# Patient Record
Sex: FEMALE | Race: ASIAN | NOT HISPANIC OR LATINO | Employment: UNEMPLOYED | URBAN - METROPOLITAN AREA
[De-identification: names, ages, dates, MRNs, and addresses within clinical notes are randomized per-mention and may not be internally consistent; named-entity substitution may affect disease eponyms.]

---

## 2022-06-24 ENCOUNTER — OFFICE VISIT (OUTPATIENT)
Dept: OBGYN CLINIC | Facility: CLINIC | Age: 57
End: 2022-06-24
Payer: COMMERCIAL

## 2022-06-24 ENCOUNTER — APPOINTMENT (OUTPATIENT)
Dept: RADIOLOGY | Facility: CLINIC | Age: 57
End: 2022-06-24
Payer: COMMERCIAL

## 2022-06-24 VITALS
WEIGHT: 190 LBS | DIASTOLIC BLOOD PRESSURE: 83 MMHG | HEIGHT: 65 IN | SYSTOLIC BLOOD PRESSURE: 138 MMHG | HEART RATE: 64 BPM | BODY MASS INDEX: 31.65 KG/M2

## 2022-06-24 DIAGNOSIS — M17.12 PRIMARY OSTEOARTHRITIS OF LEFT KNEE: ICD-10-CM

## 2022-06-24 DIAGNOSIS — M25.561 RIGHT KNEE PAIN, UNSPECIFIED CHRONICITY: ICD-10-CM

## 2022-06-24 DIAGNOSIS — M17.11 PRIMARY OSTEOARTHRITIS OF RIGHT KNEE: Primary | ICD-10-CM

## 2022-06-24 DIAGNOSIS — M25.562 LEFT KNEE PAIN, UNSPECIFIED CHRONICITY: ICD-10-CM

## 2022-06-24 PROCEDURE — 73562 X-RAY EXAM OF KNEE 3: CPT

## 2022-06-24 PROCEDURE — 99204 OFFICE O/P NEW MOD 45 MIN: CPT | Performed by: ORTHOPAEDIC SURGERY

## 2022-06-24 PROCEDURE — 20610 DRAIN/INJ JOINT/BURSA W/O US: CPT | Performed by: ORTHOPAEDIC SURGERY

## 2022-06-24 RX ORDER — DEXAMETHASONE SODIUM PHOSPHATE 100 MG/10ML
40 INJECTION INTRAMUSCULAR; INTRAVENOUS
Status: COMPLETED | OUTPATIENT
Start: 2022-06-24 | End: 2022-06-24

## 2022-06-24 RX ORDER — HYDROCHLOROTHIAZIDE 12.5 MG/1
12.5 TABLET ORAL DAILY
COMMUNITY
Start: 2022-05-07

## 2022-06-24 RX ORDER — FAMOTIDINE 20 MG/1
TABLET, FILM COATED ORAL
COMMUNITY
Start: 2022-06-23

## 2022-06-24 RX ORDER — AMLODIPINE BESYLATE 5 MG/1
5 TABLET ORAL DAILY
COMMUNITY
Start: 2022-05-07

## 2022-06-24 RX ORDER — FLUTICASONE PROPIONATE 50 MCG
SPRAY, SUSPENSION (ML) NASAL
COMMUNITY
Start: 2022-06-23

## 2022-06-24 RX ORDER — LIDOCAINE HYDROCHLORIDE 10 MG/ML
4 INJECTION, SOLUTION INFILTRATION; PERINEURAL
Status: COMPLETED | OUTPATIENT
Start: 2022-06-24 | End: 2022-06-24

## 2022-06-24 RX ORDER — ALBUTEROL SULFATE 90 UG/1
AEROSOL, METERED RESPIRATORY (INHALATION)
COMMUNITY
Start: 2022-06-23

## 2022-06-24 RX ORDER — LEVOTHYROXINE SODIUM 88 UG/1
TABLET ORAL
COMMUNITY
Start: 2022-06-23

## 2022-06-24 RX ORDER — LORATADINE 10 MG/1
10 TABLET ORAL DAILY
COMMUNITY
Start: 2022-06-23

## 2022-06-24 RX ORDER — ESTRADIOL 0.1 MG/G
CREAM VAGINAL
COMMUNITY
Start: 2022-04-28

## 2022-06-24 RX ADMIN — LIDOCAINE HYDROCHLORIDE 4 ML: 10 INJECTION, SOLUTION INFILTRATION; PERINEURAL at 16:40

## 2022-06-24 RX ADMIN — DEXAMETHASONE SODIUM PHOSPHATE 40 MG: 100 INJECTION INTRAMUSCULAR; INTRAVENOUS at 16:40

## 2022-06-24 NOTE — PROGRESS NOTES
Assessment/Plan:  1  Primary osteoarthritis of right knee  XR knee 3 vw right non injury    Large joint arthrocentesis: R knee   2  Primary osteoarthritis of left knee  XR knee 3 vw left non injury    Large joint arthrocentesis: L knee       Cecelia has bilateral knee pain consistent with osteoarthritis  Her right knee is little more severe than the left  She has moderate to severe osteoarthritis in her knees  We discussed multiple treatment options in the office today and had a lengthy discussion with the patient and her family as well as her physician son on the phone  We discussed conservative treatment including anti-inflammatories, ice, activity modification and weight loss  I also discussed more invasive treatment such as cortisone injection or viscosupplementation  I also discussed will be involved with a knee replacement if needed in the future  Patient ultimately wanted to proceed with bilateral cortisone injections which we proceed with today and she tolerated very well  Hopefully these give her significant relief going forward  I will see her back in 4 months for repeat evaluation or sooner if needed if pain increases  Large joint arthrocentesis: R knee  Universal Protocol:  Consent: Verbal consent obtained  Risks and benefits: risks, benefits and alternatives were discussed  Consent given by: patient  Time out: Immediately prior to procedure a "time out" was called to verify the correct patient, procedure, equipment, support staff and site/side marked as required    Site marked: the operative site was marked  Supporting Documentation  Indications: pain   Procedure Details  Location: knee - R knee  Preparation: Patient was prepped and draped in the usual sterile fashion  Needle size: 22 G  Ultrasound guidance: no  Approach: anterolateral  Medications administered: 40 mg dexamethasone 100 mg/10 mL; 4 mL lidocaine 1 %    Patient tolerance: patient tolerated the procedure well with no immediate complications  Dressing:  Sterile dressing applied    Large joint arthrocentesis: L knee  Universal Protocol:  Consent: Verbal consent obtained  Risks and benefits: risks, benefits and alternatives were discussed  Consent given by: patient  Time out: Immediately prior to procedure a "time out" was called to verify the correct patient, procedure, equipment, support staff and site/side marked as required  Site marked: the operative site was marked  Supporting Documentation  Indications: pain   Procedure Details  Location: knee - L knee  Preparation: Patient was prepped and draped in the usual sterile fashion  Needle size: 22 G  Ultrasound guidance: no  Approach: anterolateral  Medications administered: 40 mg dexamethasone 100 mg/10 mL; 4 mL lidocaine 1 %    Patient tolerance: patient tolerated the procedure well with no immediate complications  Dressing:  Sterile dressing applied          Subjective:   Lady Murrell is a 60 y o  female who presents to the office for evaluation for bilateral knee pain  She has a history of ongoing discomfort in both knees but the right appears to be more painful than the left  She has a history of osteoarthritis and went to another orthopedic office many years ago and had a cortisone injection the right knee  She does report that the injection helped slightly but she has not return for any repeated injections  As of late she denies any new injury but states he has been feeling aching throbbing pain to the medial aspect of both knees  The right is worse than the left  It worsens to walk or move and she has discomfort when she trying to sleep  She denies any swelling or effusion in her knees  She has been trying to take Tylenol and use topical Voltaren which has not significantly helped  She will occasionally take an Advil although she tries to avoid this with her history of GERD  Review of Systems   Constitutional: Negative for chills, fever and unexpected weight change  HENT: Negative for hearing loss, nosebleeds and sore throat  Eyes: Negative for pain, redness and visual disturbance  Respiratory: Negative for cough, shortness of breath and wheezing  Cardiovascular: Negative for chest pain, palpitations and leg swelling  Gastrointestinal: Negative for abdominal pain, nausea and vomiting  Endocrine: Negative for polydipsia and polyuria  Genitourinary: Negative for dysuria and hematuria  Musculoskeletal:        See HPI   Skin: Negative for rash and wound  Neurological: Negative for dizziness, numbness and headaches  Psychiatric/Behavioral: Negative for decreased concentration and suicidal ideas  The patient is not nervous/anxious  No past medical history on file  No past surgical history on file  No family history on file      Social History     Occupational History    Not on file   Tobacco Use    Smoking status: Not on file    Smokeless tobacco: Not on file   Substance and Sexual Activity    Alcohol use: Not on file    Drug use: Not on file    Sexual activity: Not on file         Current Outpatient Medications:     albuterol (PROVENTIL HFA,VENTOLIN HFA) 90 mcg/act inhaler, INHALE 2 PUFFS BY MOUTH FOUR TIMES A DAY FOR 30 DAYS, Disp: , Rfl:     amLODIPine (NORVASC) 5 mg tablet, Take 5 mg by mouth daily, Disp: , Rfl:     Calcium Carbonate-Vitamin D3 600-400 MG-UNIT TABS, Take 1 tablet by mouth 2 (two) times a day, Disp: , Rfl:     Diclofenac Sodium (VOLTAREN) 1 %, APPLY 2 GRAMS TO THE AFFECTED AREAS BY TOPICAL ROUTE 4 TIMES PER DAY, Disp: , Rfl:     estradiol (ESTRACE) 0 1 mg/g vaginal cream, APPLY 2G INTRAVAGINALLY THREE TIMES A WEEK AT BEDTIME FOR 2 WEEKS, THEN TWICE A WEEK AT BEDTIME FOR 2 WEEKS, AND THEN ONE TIME A WEEK THEREA, Disp: , Rfl:     famotidine (PEPCID) 20 mg tablet, TAKE 1 TABLET BY MOUTH EVERY EVENING IF NEEDED, Disp: , Rfl:     fluticasone (FLONASE) 50 mcg/act nasal spray, SPRAY 2 SPRAYS INTO EACH NOSTRIL AT BEDTIME, Disp: , Rfl:     hydrochlorothiazide (HYDRODIURIL) 12 5 mg tablet, Take 12 5 mg by mouth daily, Disp: , Rfl:     levothyroxine 88 mcg tablet, TAKE 1 TABLET EVERY DAY BY ORAL ROUTE , Disp: , Rfl:     loratadine (CLARITIN) 10 mg tablet, Take 10 mg by mouth daily, Disp: , Rfl:     No Known Allergies    Objective:  Vitals:    06/24/22 1456   BP: 138/83   Pulse: 64       Right Knee Exam     Tenderness   The patient is experiencing tenderness in the medial joint line  Range of Motion   Extension: normal   Flexion: normal     Other   Erythema: absent  Sensation: normal  Pulse: present  Swelling: none  Effusion: no effusion present      Left Knee Exam     Tenderness   The patient is experiencing tenderness in the medial joint line  Range of Motion   Extension: normal   Flexion: normal     Other   Erythema: absent  Sensation: normal  Pulse: present  Swelling: none  Effusion: no effusion present          Observations   Left Knee   Negative for effusion  Right Knee   Negative for effusion  Physical Exam  Vitals and nursing note reviewed  Constitutional:       Appearance: She is well-developed  HENT:      Head: Normocephalic and atraumatic  Eyes:      General: No scleral icterus  Conjunctiva/sclera: Conjunctivae normal    Cardiovascular:      Rate and Rhythm: Normal rate  Pulmonary:      Effort: Pulmonary effort is normal  No respiratory distress  Musculoskeletal:      Cervical back: Normal range of motion and neck supple  Right knee: No effusion  Left knee: No effusion  Comments: As noted in HPI   Skin:     General: Skin is warm and dry  Neurological:      Mental Status: She is alert and oriented to person, place, and time     Psychiatric:         Behavior: Behavior normal            I have personally reviewed pertinent films in PACS and my interpretation is as follows:  X-rays of bilateral knees demonstrate moderate to severe medial compartment narrowing in the right knee and moderate medial compartment narrowing in the left knee    Posterior left knee calcifications appear vascular in nature

## 2022-07-14 ENCOUNTER — TELEPHONE (OUTPATIENT)
Dept: OBGYN CLINIC | Facility: HOSPITAL | Age: 57
End: 2022-07-14

## 2022-07-14 NOTE — TELEPHONE ENCOUNTER
Patient calling back with fax #  She is seeing her doctor on Saturday      Dr Js Mueller  Fax # [phone]

## 2022-07-14 NOTE — TELEPHONE ENCOUNTER
Patient calling in asking if she can get a call back from Dr Omayra Negro  She feels like she may need to be sooner for her knees  She is also asking about the results of her x-rays done on 6/24  Patient would like her x-ray reports sent to her family doctor   She is going to call back with a fax #     cb # [phone]

## 2022-07-14 NOTE — TELEPHONE ENCOUNTER
Her x-ray reports stated she has arthritis over the medial compartment of both knees  This is what I explained to her in the office  If the cortisone injections did not significantly help we could proceed with lubricant injections in her knees and order them

## 2022-07-15 DIAGNOSIS — M17.11 PRIMARY OSTEOARTHRITIS OF RIGHT KNEE: Primary | ICD-10-CM

## 2022-07-15 DIAGNOSIS — M17.12 PRIMARY OSTEOARTHRITIS OF LEFT KNEE: ICD-10-CM

## 2022-07-15 NOTE — TELEPHONE ENCOUNTER
Order has been placed, Please schedule patient once approved    Appointment should be under 30 min slot as it will be done under ultrasound

## 2022-07-20 NOTE — TELEPHONE ENCOUNTER
Patient called into the office, she is a little confused and would like to speak with clinical in regards to this       I did try and explain to her but she needs a little further assistance     [phone]

## 2022-08-26 ENCOUNTER — TELEPHONE (OUTPATIENT)
Dept: OBGYN CLINIC | Facility: HOSPITAL | Age: 57
End: 2022-08-26

## 2022-08-26 NOTE — TELEPHONE ENCOUNTER
Patient wants to know when she will get relief from the pain after gel injection  Patient advised there will be some local irritation to the knee for a few days after the injections and then 4-6 weeks after last injection for full effect  Patient is wondering if the injections do not work can her R knee be helped with a knee scope vs a knee replacement

## 2022-08-26 NOTE — TELEPHONE ENCOUNTER
If his lubricant injections do not help her knees and I will direct her for consultation for knee replacement if needed    Let's see if these injections help her first

## 2022-08-26 NOTE — TELEPHONE ENCOUNTER
Patient has some questions in regards to the gel injections she will be receiving  Patient can be reached at [phone]

## 2022-09-10 ENCOUNTER — PROCEDURE VISIT (OUTPATIENT)
Dept: OBGYN CLINIC | Facility: CLINIC | Age: 57
End: 2022-09-10
Payer: COMMERCIAL

## 2022-09-10 DIAGNOSIS — M17.12 PRIMARY OSTEOARTHRITIS OF LEFT KNEE: Primary | ICD-10-CM

## 2022-09-10 DIAGNOSIS — M17.11 PRIMARY OSTEOARTHRITIS OF RIGHT KNEE: ICD-10-CM

## 2022-09-10 PROCEDURE — 20610 DRAIN/INJ JOINT/BURSA W/O US: CPT | Performed by: ORTHOPAEDIC SURGERY

## 2022-09-10 RX ORDER — HYALURONATE SODIUM 10 MG/ML
20 SYRINGE (ML) INTRAARTICULAR
Status: COMPLETED | OUTPATIENT
Start: 2022-09-10 | End: 2022-09-10

## 2022-09-10 RX ADMIN — Medication 20 MG: at 09:39

## 2022-09-10 NOTE — PROGRESS NOTES
Assessment/Plan:  1  Primary osteoarthritis of left knee  Large joint arthrocentesis: L knee   2  Primary osteoarthritis of right knee  Large joint arthrocentesis: R knee       Cecelia tolerated bilateral Euflexxa injections today  This was the first of the series of injections  Follow-up in 1 week for the next injection  Subjective:   Zaire Arevalo is a 60 y o  female who presents to the office for her first Euflexxa injections bilaterally         No past medical history on file  No past surgical history on file  No family history on file      Social History     Occupational History    Not on file   Tobacco Use    Smoking status: Not on file    Smokeless tobacco: Not on file   Substance and Sexual Activity    Alcohol use: Not on file    Drug use: Not on file    Sexual activity: Not on file         Current Outpatient Medications:     albuterol (PROVENTIL HFA,VENTOLIN HFA) 90 mcg/act inhaler, INHALE 2 PUFFS BY MOUTH FOUR TIMES A DAY FOR 30 DAYS, Disp: , Rfl:     amLODIPine (NORVASC) 5 mg tablet, Take 5 mg by mouth daily, Disp: , Rfl:     Calcium Carbonate-Vitamin D3 600-400 MG-UNIT TABS, Take 1 tablet by mouth 2 (two) times a day, Disp: , Rfl:     Diclofenac Sodium (VOLTAREN) 1 %, APPLY 2 GRAMS TO THE AFFECTED AREAS BY TOPICAL ROUTE 4 TIMES PER DAY, Disp: , Rfl:     estradiol (ESTRACE) 0 1 mg/g vaginal cream, APPLY 2G INTRAVAGINALLY THREE TIMES A WEEK AT BEDTIME FOR 2 WEEKS, THEN TWICE A WEEK AT BEDTIME FOR 2 WEEKS, AND THEN ONE TIME A WEEK THEREA, Disp: , Rfl:     famotidine (PEPCID) 20 mg tablet, TAKE 1 TABLET BY MOUTH EVERY EVENING IF NEEDED, Disp: , Rfl:     fluticasone (FLONASE) 50 mcg/act nasal spray, SPRAY 2 SPRAYS INTO EACH NOSTRIL AT BEDTIME, Disp: , Rfl:     hydrochlorothiazide (HYDRODIURIL) 12 5 mg tablet, Take 12 5 mg by mouth daily, Disp: , Rfl:     levothyroxine 88 mcg tablet, TAKE 1 TABLET EVERY DAY BY ORAL ROUTE , Disp: , Rfl:     loratadine (CLARITIN) 10 mg tablet, Take 10 mg by mouth daily, Disp: , Rfl:     No Known Allergies    Objective: There were no vitals filed for this visit  Ortho Exam    Physical Exam    Large joint arthrocentesis: R knee  Universal Protocol:  Consent given by: patient  Site marked: the operative site was marked  Supporting Documentation  Indications: pain   Procedure Details  Location: knee - R knee  Needle size: 22 G  Ultrasound guidance: no  Approach: anterolateral  Medications administered: 20 mg Sodium Hyaluronate 20 MG/2ML  Specialty Pharmacy Supplied: received medications from pharmacy  Patient tolerance: patient tolerated the procedure well with no immediate complications  Dressing:  Sterile dressing applied    Large joint arthrocentesis: L knee  Universal Protocol:  Consent: Verbal consent obtained  Risks and benefits: risks, benefits and alternatives were discussed  Consent given by: patient  Site marked: the operative site was marked  Supporting Documentation  Indications: pain   Procedure Details  Location: knee - L knee  Needle size: 22 G  Ultrasound guidance: no  Approach: anterolateral  Medications administered: 20 mg Sodium Hyaluronate 20 MG/2ML  Specialty Pharmacy Supplied: received medications from pharmacy  Patient tolerance: patient tolerated the procedure well with no immediate complications  Dressing:  Sterile dressing applied            This document was created using speech voice recognition software  Grammatical errors, random word insertions, pronoun errors, and incomplete sentences are an occasional consequence of this system due to software limitations, ambient noise, and hardware issues  Any formal questions or concerns about content, text, or information contained within the body of this dictation should be directly addressed to the provider for clarification

## 2022-09-20 ENCOUNTER — PROCEDURE VISIT (OUTPATIENT)
Dept: OBGYN CLINIC | Facility: CLINIC | Age: 57
End: 2022-09-20
Payer: COMMERCIAL

## 2022-09-20 VITALS — WEIGHT: 190 LBS | BODY MASS INDEX: 31.62 KG/M2

## 2022-09-20 DIAGNOSIS — M17.12 PRIMARY OSTEOARTHRITIS OF LEFT KNEE: Primary | ICD-10-CM

## 2022-09-20 DIAGNOSIS — M17.11 PRIMARY OSTEOARTHRITIS OF RIGHT KNEE: ICD-10-CM

## 2022-09-20 PROCEDURE — 20610 DRAIN/INJ JOINT/BURSA W/O US: CPT | Performed by: ORTHOPAEDIC SURGERY

## 2022-09-20 RX ORDER — HYALURONATE SODIUM 10 MG/ML
20 SYRINGE (ML) INTRAARTICULAR
Status: COMPLETED | OUTPATIENT
Start: 2022-09-20 | End: 2022-09-20

## 2022-09-20 RX ADMIN — Medication 20 MG: at 18:18

## 2022-09-20 NOTE — PROGRESS NOTES
Assessment/Plan:  1  Primary osteoarthritis of left knee     2  Primary osteoarthritis of right knee       Cecelia tolerated bilateral Euflexxa injections today  Follow-up in 1 week for the last injections  Subjective:   Stanton Sky is a 60 y o  female who presents for her second Euflexxa injection bilaterally         No past medical history on file  No past surgical history on file  No family history on file  Social History     Occupational History    Not on file   Tobacco Use    Smoking status: Not on file    Smokeless tobacco: Not on file   Substance and Sexual Activity    Alcohol use: Not on file    Drug use: Not on file    Sexual activity: Not on file         Current Outpatient Medications:     albuterol (PROVENTIL HFA,VENTOLIN HFA) 90 mcg/act inhaler, INHALE 2 PUFFS BY MOUTH FOUR TIMES A DAY FOR 30 DAYS, Disp: , Rfl:     amLODIPine (NORVASC) 5 mg tablet, Take 5 mg by mouth daily, Disp: , Rfl:     Calcium Carbonate-Vitamin D3 600-400 MG-UNIT TABS, Take 1 tablet by mouth 2 (two) times a day, Disp: , Rfl:     Diclofenac Sodium (VOLTAREN) 1 %, APPLY 2 GRAMS TO THE AFFECTED AREAS BY TOPICAL ROUTE 4 TIMES PER DAY, Disp: , Rfl:     estradiol (ESTRACE) 0 1 mg/g vaginal cream, APPLY 2G INTRAVAGINALLY THREE TIMES A WEEK AT BEDTIME FOR 2 WEEKS, THEN TWICE A WEEK AT BEDTIME FOR 2 WEEKS, AND THEN ONE TIME A WEEK THEREA, Disp: , Rfl:     famotidine (PEPCID) 20 mg tablet, TAKE 1 TABLET BY MOUTH EVERY EVENING IF NEEDED, Disp: , Rfl:     fluticasone (FLONASE) 50 mcg/act nasal spray, SPRAY 2 SPRAYS INTO EACH NOSTRIL AT BEDTIME, Disp: , Rfl:     hydrochlorothiazide (HYDRODIURIL) 12 5 mg tablet, Take 12 5 mg by mouth daily, Disp: , Rfl:     levothyroxine 88 mcg tablet, TAKE 1 TABLET EVERY DAY BY ORAL ROUTE , Disp: , Rfl:     loratadine (CLARITIN) 10 mg tablet, Take 10 mg by mouth daily, Disp: , Rfl:     No Known Allergies    Objective: There were no vitals filed for this visit      Ortho Exam    Physical Exam    Large joint arthrocentesis: R knee  Universal Protocol:  Consent: Verbal consent obtained  Risks and benefits: risks, benefits and alternatives were discussed  Consent given by: patient  Site marked: the operative site was marked  Supporting Documentation  Indications: pain   Procedure Details  Location: knee - R knee  Needle size: 22 G  Ultrasound guidance: no  Approach: anterolateral  Medications administered: 20 mg Sodium Hyaluronate 20 MG/2ML  Specialty Pharmacy Supplied: received medications from pharmacy  Patient tolerance: patient tolerated the procedure well with no immediate complications  Dressing:  Sterile dressing applied    Large joint arthrocentesis: L knee  Universal Protocol:  Consent: Verbal consent obtained  Risks and benefits: risks, benefits and alternatives were discussed  Consent given by: patient  Site marked: the operative site was marked  Supporting Documentation  Indications: pain   Procedure Details  Location: knee - L knee  Needle size: 22 G  Ultrasound guidance: no  Approach: anterolateral  Medications administered: 20 mg Sodium Hyaluronate 20 MG/2ML  Specialty Pharmacy Supplied: received medications from pharmacy  Patient tolerance: patient tolerated the procedure well with no immediate complications  Dressing:  Sterile dressing applied            This document was created using speech voice recognition software  Grammatical errors, random word insertions, pronoun errors, and incomplete sentences are an occasional consequence of this system due to software limitations, ambient noise, and hardware issues  Any formal questions or concerns about content, text, or information contained within the body of this dictation should be directly addressed to the provider for clarification

## 2022-09-27 ENCOUNTER — PROCEDURE VISIT (OUTPATIENT)
Dept: OBGYN CLINIC | Facility: CLINIC | Age: 57
End: 2022-09-27
Payer: COMMERCIAL

## 2022-09-27 DIAGNOSIS — M17.12 PRIMARY OSTEOARTHRITIS OF LEFT KNEE: Primary | ICD-10-CM

## 2022-09-27 DIAGNOSIS — M17.11 PRIMARY OSTEOARTHRITIS OF RIGHT KNEE: ICD-10-CM

## 2022-09-27 PROCEDURE — 20610 DRAIN/INJ JOINT/BURSA W/O US: CPT | Performed by: ORTHOPAEDIC SURGERY

## 2022-09-27 RX ORDER — HYALURONATE SODIUM 10 MG/ML
20 SYRINGE (ML) INTRAARTICULAR
Status: COMPLETED | OUTPATIENT
Start: 2022-09-27 | End: 2022-09-27

## 2022-09-27 RX ADMIN — Medication 20 MG: at 18:32

## 2022-09-27 NOTE — PROGRESS NOTES
Assessment/Plan:  1  Primary osteoarthritis of left knee     2  Primary osteoarthritis of right knee       Cecelia tolerated bilateral Euflexxa injections today  We could repeat these injections in 6 months if clinically indicated  Subjective:   Gina Vidal is a 60 y o  female who presents to the office for her third Euflexxa injection bilaterally  No past medical history on file  No past surgical history on file  No family history on file  Social History     Occupational History    Not on file   Tobacco Use    Smoking status: Not on file    Smokeless tobacco: Not on file   Substance and Sexual Activity    Alcohol use: Not on file    Drug use: Not on file    Sexual activity: Not on file         Current Outpatient Medications:     albuterol (PROVENTIL HFA,VENTOLIN HFA) 90 mcg/act inhaler, INHALE 2 PUFFS BY MOUTH FOUR TIMES A DAY FOR 30 DAYS, Disp: , Rfl:     amLODIPine (NORVASC) 5 mg tablet, Take 5 mg by mouth daily, Disp: , Rfl:     Calcium Carbonate-Vitamin D3 600-400 MG-UNIT TABS, Take 1 tablet by mouth 2 (two) times a day, Disp: , Rfl:     Diclofenac Sodium (VOLTAREN) 1 %, APPLY 2 GRAMS TO THE AFFECTED AREAS BY TOPICAL ROUTE 4 TIMES PER DAY, Disp: , Rfl:     estradiol (ESTRACE) 0 1 mg/g vaginal cream, APPLY 2G INTRAVAGINALLY THREE TIMES A WEEK AT BEDTIME FOR 2 WEEKS, THEN TWICE A WEEK AT BEDTIME FOR 2 WEEKS, AND THEN ONE TIME A WEEK THEREA, Disp: , Rfl:     famotidine (PEPCID) 20 mg tablet, TAKE 1 TABLET BY MOUTH EVERY EVENING IF NEEDED, Disp: , Rfl:     fluticasone (FLONASE) 50 mcg/act nasal spray, SPRAY 2 SPRAYS INTO EACH NOSTRIL AT BEDTIME, Disp: , Rfl:     hydrochlorothiazide (HYDRODIURIL) 12 5 mg tablet, Take 12 5 mg by mouth daily, Disp: , Rfl:     levothyroxine 88 mcg tablet, TAKE 1 TABLET EVERY DAY BY ORAL ROUTE , Disp: , Rfl:     loratadine (CLARITIN) 10 mg tablet, Take 10 mg by mouth daily, Disp: , Rfl:     No Known Allergies    Objective:   There were no vitals filed for this visit  Ortho Exam    Physical Exam    Large joint arthrocentesis: R knee  Universal Protocol:  Consent: Verbal consent obtained  Risks and benefits: risks, benefits and alternatives were discussed  Consent given by: patient  Site marked: the operative site was marked  Supporting Documentation  Indications: pain   Procedure Details  Location: knee - R knee  Needle size: 22 G  Ultrasound guidance: no  Approach: anterolateral  Medications administered: 20 mg Sodium Hyaluronate 20 MG/2ML  Specialty Pharmacy Supplied: received medications from pharmacy  Patient tolerance: patient tolerated the procedure well with no immediate complications  Dressing:  Sterile dressing applied    Large joint arthrocentesis: L knee  Universal Protocol:  Consent: Verbal consent obtained  Risks and benefits: risks, benefits and alternatives were discussed  Consent given by: patient  Site marked: the operative site was marked  Supporting Documentation  Indications: pain   Procedure Details  Location: knee - L knee  Needle size: 22 G  Ultrasound guidance: no  Approach: anterolateral  Medications administered: 20 mg Sodium Hyaluronate 20 MG/2ML  Specialty Pharmacy Supplied: received medications from pharmacy  Patient tolerance: patient tolerated the procedure well with no immediate complications  Dressing:  Sterile dressing applied            This document was created using speech voice recognition software  Grammatical errors, random word insertions, pronoun errors, and incomplete sentences are an occasional consequence of this system due to software limitations, ambient noise, and hardware issues  Any formal questions or concerns about content, text, or information contained within the body of this dictation should be directly addressed to the provider for clarification

## 2023-02-10 LAB
EXTERNAL HIV SCREEN: NORMAL
HBA1C MFR BLD HPLC: 6 %

## 2023-02-17 ENCOUNTER — OFFICE VISIT (OUTPATIENT)
Dept: OBGYN CLINIC | Facility: CLINIC | Age: 58
End: 2023-02-17

## 2023-02-17 VITALS
HEART RATE: 75 BPM | BODY MASS INDEX: 31.65 KG/M2 | DIASTOLIC BLOOD PRESSURE: 80 MMHG | HEIGHT: 65 IN | WEIGHT: 190 LBS | SYSTOLIC BLOOD PRESSURE: 145 MMHG

## 2023-02-17 DIAGNOSIS — M17.12 PRIMARY OSTEOARTHRITIS OF LEFT KNEE: ICD-10-CM

## 2023-02-17 DIAGNOSIS — M17.11 PRIMARY OSTEOARTHRITIS OF RIGHT KNEE: Primary | ICD-10-CM

## 2023-02-17 RX ORDER — BLOOD-GLUCOSE METER
EACH MISCELLANEOUS
COMMUNITY
Start: 2023-01-09

## 2023-02-17 RX ORDER — OMEPRAZOLE 40 MG/1
40 CAPSULE, DELAYED RELEASE ORAL EVERY MORNING
COMMUNITY
Start: 2023-02-07

## 2023-02-17 RX ORDER — CHOLECALCIFEROL (VITAMIN D3) 25 MCG
1000 CAPSULE ORAL DAILY
COMMUNITY
Start: 2022-12-05

## 2023-02-17 RX ORDER — DEXTROMETHORPHAN HYDROBROMIDE AND PROMETHAZINE HYDROCHLORIDE 15; 6.25 MG/5ML; MG/5ML
SYRUP ORAL
COMMUNITY
Start: 2023-01-01

## 2023-02-17 RX ORDER — LEVOFLOXACIN 500 MG/1
TABLET, FILM COATED ORAL
COMMUNITY
Start: 2023-01-01

## 2023-02-17 RX ORDER — ATORVASTATIN CALCIUM 20 MG/1
TABLET, FILM COATED ORAL
COMMUNITY
Start: 2023-02-07

## 2023-02-17 RX ORDER — DEXTROMETHORPHAN HBR, GUAIFENESIN 20; 400 MG/20ML; MG/20ML
SOLUTION ORAL
COMMUNITY
Start: 2023-01-11

## 2023-02-17 RX ORDER — BLOOD SUGAR DIAGNOSTIC
STRIP MISCELLANEOUS
COMMUNITY
Start: 2023-01-19

## 2023-02-17 RX ORDER — PREDNISONE 20 MG/1
TABLET ORAL
COMMUNITY
Start: 2023-01-01

## 2023-02-17 NOTE — PROGRESS NOTES
Assessment/Plan:  1  Primary osteoarthritis of right knee  Ambulatory referral to Orthopedic Surgery    Ambulatory referral to Physical Therapy      2  Primary osteoarthritis of left knee  Ambulatory referral to Orthopedic Surgery    Ambulatory referral to Physical Therapy        Cecelia has continued bilateral knee pain and has not significantly improved with conservative measures of viscosupplementation  While they do help slightly she has a lot of questions about the potential for knee replacement  She does not want to continue with viscosupplementation just yet and would like to discuss knee replacement possibility as she is tired of feeling this pain  I did refer her to Dr Lena Cagle to discuss potential knee replacement likely beginning with the right knee first   She will most likely require new x-rays at her next visit with Dr Lena Cagle  If she decides to pursue viscosupplementation either in the left knee or both knees in 1 month we could see her in the [facility] office to administer those  She will also begin physical therapy to see if this does help for her at this time  Follow-up with Dr Lena Cagle at the next available appointment  Subjective:   Volodymyr Mejia is a 59 y o  female who presents to the office for evaluation for bilateral knee pain  She has a history of osteoarthritis in both knees and underwent a series of Visco supplement injections 5 months ago in our office  She states she experienced relief from these injections for about 3 months and now the pain has increased over the last 2 months  She has pain that is worse in the right knee than the left  It significantly bothers her after a lot of walking  She had already failed cortisone injections initially  X-rays 1 year ago showed moderate osteoarthritis in the right knee and mild in the left knee  Review of Systems   Constitutional: Negative for chills, fever and unexpected weight change     HENT: Negative for hearing loss, nosebleeds and sore throat  Eyes: Negative for pain, redness and visual disturbance  Respiratory: Negative for cough, shortness of breath and wheezing  Cardiovascular: Negative for chest pain, palpitations and leg swelling  Gastrointestinal: Negative for abdominal pain, nausea and vomiting  Endocrine: Negative for polydipsia and polyuria  Genitourinary: Negative for dysuria and hematuria  Musculoskeletal:        See HPI   Skin: Negative for rash and wound  Neurological: Negative for dizziness, numbness and headaches  Psychiatric/Behavioral: Negative for decreased concentration and suicidal ideas  The patient is not nervous/anxious  History reviewed  No pertinent past medical history  History reviewed  No pertinent surgical history  History reviewed  No pertinent family history      Social History     Occupational History   • Not on file   Tobacco Use   • Smoking status: Never   • Smokeless tobacco: Never   Vaping Use   • Vaping Use: Never used   Substance and Sexual Activity   • Alcohol use: Not on file   • Drug use: Not on file   • Sexual activity: Not on file         Current Outpatient Medications:   •  albuterol (PROVENTIL HFA,VENTOLIN HFA) 90 mcg/act inhaler, INHALE 2 PUFFS BY MOUTH FOUR TIMES A DAY FOR 30 DAYS, Disp: , Rfl:   •  amLODIPine (NORVASC) 5 mg tablet, Take 5 mg by mouth daily, Disp: , Rfl:   •  atorvastatin (LIPITOR) 20 mg tablet, TAKE 1 TABLET EVERY DAY BY ORAL ROUTE , Disp: , Rfl:   •  Blood Glucose Monitoring Suppl (ONE TOUCH ULTRA 2) w/Device KIT, USE AS DIRECTED FOR 30 DAYS, Disp: , Rfl:   •  Calcium Carbonate-Vitamin D3 600-400 MG-UNIT TABS, Take 1 tablet by mouth 2 (two) times a day, Disp: , Rfl:   •  D3 High Potency 25 MCG (1000 UT) capsule, Take 1,000 Units by mouth daily, Disp: , Rfl:   •  Diclofenac Sodium (VOLTAREN) 1 %, APPLY 2 GRAMS TO THE AFFECTED AREAS BY TOPICAL ROUTE 4 TIMES PER DAY, Disp: , Rfl:   •  estradiol (ESTRACE) 0 1 mg/g vaginal cream, APPLY 2G INTRAVAGINALLY THREE TIMES A WEEK AT BEDTIME FOR 2 WEEKS, THEN TWICE A WEEK AT BEDTIME FOR 2 WEEKS, AND THEN ONE TIME A WEEK THEREA, Disp: , Rfl:   •  famotidine (PEPCID) 20 mg tablet, TAKE 1 TABLET BY MOUTH EVERY EVENING IF NEEDED, Disp: , Rfl:   •  fluticasone (FLONASE) 50 mcg/act nasal spray, SPRAY 2 SPRAYS INTO EACH NOSTRIL AT BEDTIME, Disp: , Rfl:   •  hydrochlorothiazide (HYDRODIURIL) 12 5 mg tablet, Take 12 5 mg by mouth daily, Disp: , Rfl:   •  levofloxacin (LEVAQUIN) 500 mg tablet, TAKE 1 TABLET BY MOUTH EVERY DAY FOR 10 DAYS, Disp: , Rfl:   •  levothyroxine 88 mcg tablet, TAKE 1 TABLET EVERY DAY BY ORAL ROUTE , Disp: , Rfl:   •  loratadine (CLARITIN) 10 mg tablet, Take 10 mg by mouth daily, Disp: , Rfl:   •  metFORMIN (GLUCOPHAGE) 500 mg tablet, TAKE 1 TABLET TWICE A DAY BY ORAL ROUTE , Disp: , Rfl:   •  omeprazole (PriLOSEC) 40 MG capsule, Take 40 mg by mouth every morning, Disp: , Rfl:   •  OneTouch Ultra test strip, USE AS DIRECTED FOR 90 DAYS TEST DAILY, Disp: , Rfl:   •  predniSONE 20 mg tablet, TAKE 1 TABLET BY MOUTH EVERY DAY FOR 5 DAYS, Disp: , Rfl:   •  promethazine-dextromethorphan (PHENERGAN-DM) 6 25-15 mg/5 mL oral syrup, TAKE 5ML BY MOUTH EVERY 6 HOURS AS NEEDED FOR 7 DAYS, Disp: , Rfl:   •  Tussin DM Max Adult 5-100 MG/5ML LIQD, TAKE 10 ML 3 TIMES A DAY BY ORAL ROUTE AS NEEDED , Disp: , Rfl:     No Known Allergies    Objective:  Vitals:    02/17/23 0811   BP: 145/80   Pulse: 75       Right Knee Exam     Tenderness   The patient is experiencing tenderness in the medial joint line  Range of Motion   Extension: normal   Flexion: normal     Other   Erythema: absent  Sensation: normal  Pulse: present  Swelling: none  Effusion: no effusion present      Left Knee Exam     Tenderness   The patient is experiencing tenderness in the medial joint line      Range of Motion   Extension: normal   Flexion: normal     Other   Erythema: absent  Sensation: normal  Pulse: present  Swelling: none  Effusion: no effusion present          Observations   Left Knee   Negative for effusion  Right Knee   Negative for effusion  Physical Exam  Vitals and nursing note reviewed  Constitutional:       Appearance: She is well-developed  HENT:      Head: Normocephalic and atraumatic  Right Ear: External ear normal       Left Ear: External ear normal    Eyes:      General: No scleral icterus  Extraocular Movements: Extraocular movements intact  Conjunctiva/sclera: Conjunctivae normal    Cardiovascular:      Rate and Rhythm: Normal rate  Pulmonary:      Effort: Pulmonary effort is normal  No respiratory distress  Musculoskeletal:      Cervical back: Normal range of motion and neck supple  Right knee: No effusion  Left knee: No effusion  Comments: See Ortho exam   Skin:     General: Skin is warm and dry  Neurological:      Mental Status: She is alert and oriented to person, place, and time  Psychiatric:         Behavior: Behavior normal              This document was created using speech voice recognition software  Grammatical errors, random word insertions, pronoun errors, and incomplete sentences are an occasional consequence of this system due to software limitations, ambient noise, and hardware issues  Any formal questions or concerns about content, text, or information contained within the body of this dictation should be directly addressed to the provider for clarification

## 2023-03-01 ENCOUNTER — APPOINTMENT (OUTPATIENT)
Dept: RADIOLOGY | Facility: CLINIC | Age: 58
End: 2023-03-01

## 2023-03-01 ENCOUNTER — OFFICE VISIT (OUTPATIENT)
Dept: OBGYN CLINIC | Facility: CLINIC | Age: 58
End: 2023-03-01

## 2023-03-01 VITALS
DIASTOLIC BLOOD PRESSURE: 72 MMHG | TEMPERATURE: 98 F | HEART RATE: 60 BPM | BODY MASS INDEX: 30.82 KG/M2 | SYSTOLIC BLOOD PRESSURE: 122 MMHG | HEIGHT: 65 IN | WEIGHT: 185 LBS

## 2023-03-01 DIAGNOSIS — G89.29 CHRONIC PAIN OF BOTH KNEES: ICD-10-CM

## 2023-03-01 DIAGNOSIS — Z86.79 HISTORY OF HYPERTENSION: ICD-10-CM

## 2023-03-01 DIAGNOSIS — M17.0 PRIMARY OSTEOARTHRITIS OF BOTH KNEES: Primary | ICD-10-CM

## 2023-03-01 DIAGNOSIS — Z86.39 HISTORY OF DIABETES MELLITUS: ICD-10-CM

## 2023-03-01 DIAGNOSIS — Z01.818 PRE-OP EXAM: ICD-10-CM

## 2023-03-01 DIAGNOSIS — M25.562 CHRONIC PAIN OF BOTH KNEES: ICD-10-CM

## 2023-03-01 DIAGNOSIS — M25.561 CHRONIC PAIN OF BOTH KNEES: ICD-10-CM

## 2023-03-01 DIAGNOSIS — M17.11 PRIMARY OSTEOARTHRITIS OF RIGHT KNEE: ICD-10-CM

## 2023-03-01 DIAGNOSIS — M17.12 PRIMARY OSTEOARTHRITIS OF LEFT KNEE: ICD-10-CM

## 2023-03-01 RX ORDER — FERROUS SULFATE TAB EC 324 MG (65 MG FE EQUIVALENT) 324 (65 FE) MG
324 TABLET DELAYED RESPONSE ORAL
Qty: 30 TABLET | Refills: 0 | Status: SHIPPED | OUTPATIENT
Start: 2023-03-01 | End: 2023-03-31

## 2023-03-01 RX ORDER — FOLIC ACID 1 MG/1
1 TABLET ORAL DAILY
Qty: 30 TABLET | Refills: 0 | Status: SHIPPED | OUTPATIENT
Start: 2023-03-01 | End: 2023-03-31

## 2023-03-01 RX ORDER — MELATONIN
1000 DAILY
Qty: 30 TABLET | Refills: 0 | Status: SHIPPED | OUTPATIENT
Start: 2023-03-01 | End: 2023-03-31

## 2023-03-01 RX ORDER — LIDOCAINE 40 MG/G
CREAM TOPICAL
COMMUNITY
Start: 2023-02-18

## 2023-03-01 RX ORDER — ASCORBIC ACID 500 MG
500 TABLET ORAL 2 TIMES DAILY
Qty: 60 TABLET | Refills: 0 | Status: SHIPPED | OUTPATIENT
Start: 2023-03-01 | End: 2023-03-31

## 2023-03-01 RX ORDER — ZINC SULFATE 50(220)MG
220 CAPSULE ORAL DAILY
Qty: 30 CAPSULE | Refills: 0 | Status: SHIPPED | OUTPATIENT
Start: 2023-03-01 | End: 2023-03-31

## 2023-03-01 NOTE — PROGRESS NOTES
Assessment/Plan:  1  Primary osteoarthritis of both knees  Ambulatory referral to Orthopedic Surgery    XR knee 3 vw left non injury    XR knee 3 vw right non injury    Case request operating room: ARTHROPLASTY KNEE TOTAL W ROBOT - RIGHT - PRESS FIT - OVERNIGHT    Comprehensive metabolic panel    Hemoglobin A1C W/EAG Estimation    CBC and differential    If Symptomatic, order: UA w Reflex to Microscopic w Reflex to Culture    Protime-INR    APTT    Ambulatory referral to Cardiology    Ambulatory referral to [facility]    Ambulatory referral to Physical Therapy    EKG 12 lead    XR chest pa & lateral    MRSA culture    Comprehensive metabolic panel    CBC and differential    Protime-INR    APTT    MRSA culture    Case request operating room: ARTHROPLASTY KNEE TOTAL W ROBOT - RIGHT - PRESS FIT - OVERNIGHT    ascorbic acid (VITAMIN C) 500 MG tablet    ferrous sulfate 324 (65 Fe) mg    folic acid (FOLVITE) 1 mg tablet    cholecalciferol (VITAMIN D3) 1,000 units tablet    zinc sulfate (ZINCATE) 220 mg capsule      2  Chronic pain of both knees  Ambulatory referral to Orthopedic Surgery    XR knee 3 vw left non injury    XR knee 3 vw right non injury    Case request operating room: ARTHROPLASTY KNEE TOTAL W ROBOT - RIGHT - PRESS FIT - OVERNIGHT    Comprehensive metabolic panel    Hemoglobin A1C W/EAG Estimation    CBC and differential    If Symptomatic, order: UA w Reflex to Microscopic w Reflex to Culture    Protime-INR    APTT    Ambulatory referral to Cardiology    Ambulatory referral to [facility]    Ambulatory referral to Physical Therapy    EKG 12 lead    XR chest pa & lateral    MRSA culture    Comprehensive metabolic panel    CBC and differential    Protime-INR    APTT    MRSA culture    Case request operating room: ARTHROPLASTY KNEE TOTAL W ROBOT - RIGHT - PRESS FIT - OVERNIGHT      3  History of diabetes mellitus        4  History of hypertension  Ambulatory referral to Cardiology      5   Pre-op exam Ambulatory referral to Cardiology    Ambulatory referral to [facility]    Ambulatory referral to Physical Therapy        Scribe Attestation    I,:  Blas Meyer am acting as a scribe while in the presence of the attending physician :       I,:  Sergio Cleaning,  personally performed the services described in this documentation    as scribed in my presence :         Chandana Blevins is a pleasant 49-year-old female who presents today for initial evaluation of her bilateral knee pain  After reviewing her imaging and performing a thorough history and physical exam I explained that she is symptomatic of her severe underlying bilateral knee osteoarthritis  Based on the progression of her underlying disease and her persistent pain despite activity modification, maintenance of appropriate weight, exercise program, and intra-articular corticosteroid and viscosupplementation injection series I explained that she is good candidate for robotic assisted right total knee arthroplasty  The pre sarah and postoperative expectations were discussed here in the office today  The risks and benefits of undergoing robotic assisted right total knee arthroplasty were discussed at length and consents were signed and placed in the chart  Please see risk discussion below  She denies history of DVT/PE, MRSA infection, malignancy, or GI bleeding  She does have a history of diabetes which is well controlled and reports a history of gastric ulcer  We will avoid use of perioperative NSAIDs  She is not a smoker  She understands she requires preoperative clearance from her primary care physician and cardiologist   She is an excellent candidate for outpatient physical therapy postoperatively  She will stay overnight postoperatively and we will utilize press-fit implants  She will meet with my surgery scheduler today to pick a date for her procedure and make preoperative arrangements  All of her questions and concerns were addressed today  We will see her back at time of surgery  Subjective: Initial evaluation for bilateral knee pain    Patient ID: Marychuy Khan is a 59 y o  female who presents today for initial evaluation of her bilateral knee pain  At today's visit, she complains of activity related pain in her knees for quite some time  She has received conservative treatment with Dr Maulik Avalos including physical therapy, intra-articular corticosteroid and viscosupplementation injections  She does report that the viscosupplementation injections help to mitigate her pain for approximately 3 to 4 months following the administration  She complains of persistent pain in her bilateral knees which is worse on the right over the left  This pain can reach 6/10 on the pain scale  Her pain increases with all activity and when driving and is somewhat better at rest   She would like to discuss total knee arthroplasty  She is not currently employed  Review of Systems   Constitutional: Positive for activity change  Negative for chills, fever and unexpected weight change  HENT: Negative for hearing loss, nosebleeds and sore throat  Eyes: Negative for pain, redness and visual disturbance  Respiratory: Negative for cough, shortness of breath and wheezing  Cardiovascular: Negative for chest pain, palpitations and leg swelling  Gastrointestinal: Negative for abdominal pain, nausea and vomiting  Endocrine: Negative for polydipsia and polyuria  Genitourinary: Negative for dysuria and hematuria  Musculoskeletal: Positive for arthralgias and myalgias  Negative for joint swelling  See HPI   Skin: Negative for rash and wound  Neurological: Negative for dizziness, numbness and headaches  Psychiatric/Behavioral: Negative for decreased concentration and suicidal ideas  The patient is not nervous/anxious            Past Medical History:   Diagnosis Date   • Depression 2016   • Diabetes mellitus [facility]) december 2022   • Disease of thyroid gland 2005   • Hypertension june 2022   • Osteoarthritis 2013       History reviewed  No pertinent surgical history  Family History   Problem Relation Age of Onset   • Diabetes Mother    • Cancer Maternal Aunt         lung cancer       Social History     Occupational History   • Not on file   Tobacco Use   • Smoking status: Never   • Smokeless tobacco: Never   Vaping Use   • Vaping Use: Never used   Substance and Sexual Activity   • Alcohol use:  Yes     Alcohol/week: 1 0 standard drink     Types: 1 Glasses of wine per week     Comment: social drinking per month   • Drug use: Never   • Sexual activity: Not Currently     Partners: Male     Birth control/protection: Condom Male         Current Outpatient Medications:   •  ascorbic acid (VITAMIN C) 500 MG tablet, Take 1 tablet (500 mg total) by mouth 2 (two) times a day, Disp: 60 tablet, Rfl: 0  •  cholecalciferol (VITAMIN D3) 1,000 units tablet, Take 1 tablet (1,000 Units total) by mouth daily, Disp: 30 tablet, Rfl: 0  •  ferrous sulfate 324 (65 Fe) mg, Take 1 tablet (324 mg total) by mouth daily before breakfast, Disp: 30 tablet, Rfl: 0  •  folic acid (FOLVITE) 1 mg tablet, Take 1 tablet (1 mg total) by mouth daily, Disp: 30 tablet, Rfl: 0  •  zinc sulfate (ZINCATE) 220 mg capsule, Take 1 capsule (220 mg total) by mouth daily, Disp: 30 capsule, Rfl: 0  •  albuterol (PROVENTIL HFA,VENTOLIN HFA) 90 mcg/act inhaler, INHALE 2 PUFFS BY MOUTH FOUR TIMES A DAY FOR 30 DAYS, Disp: , Rfl:   •  amLODIPine (NORVASC) 5 mg tablet, Take 5 mg by mouth daily, Disp: , Rfl:   •  atorvastatin (LIPITOR) 20 mg tablet, TAKE 1 TABLET EVERY DAY BY ORAL ROUTE , Disp: , Rfl:   •  Blood Glucose Monitoring Suppl (ONE TOUCH ULTRA 2) w/Device KIT, USE AS DIRECTED FOR 30 DAYS, Disp: , Rfl:   •  Calcium Carbonate-Vitamin D3 600-400 MG-UNIT TABS, Take 1 tablet by mouth 2 (two) times a day, Disp: , Rfl:   •  D3 High Potency 25 MCG (1000 UT) capsule, Take 1,000 Units by mouth daily, Disp: , Rfl:   • Diclofenac Sodium (VOLTAREN) 1 %, APPLY 2 GRAMS TO THE AFFECTED AREAS BY TOPICAL ROUTE 4 TIMES PER DAY, Disp: , Rfl:   •  estradiol (ESTRACE) 0 1 mg/g vaginal cream, APPLY 2G INTRAVAGINALLY THREE TIMES A WEEK AT BEDTIME FOR 2 WEEKS, THEN TWICE A WEEK AT BEDTIME FOR 2 WEEKS, AND THEN ONE TIME A WEEK THEREA, Disp: , Rfl:   •  famotidine (PEPCID) 20 mg tablet, TAKE 1 TABLET BY MOUTH EVERY EVENING IF NEEDED, Disp: , Rfl:   •  fluticasone (FLONASE) 50 mcg/act nasal spray, SPRAY 2 SPRAYS INTO EACH NOSTRIL AT BEDTIME, Disp: , Rfl:   •  hydrochlorothiazide (HYDRODIURIL) 12 5 mg tablet, Take 12 5 mg by mouth daily, Disp: , Rfl:   •  levofloxacin (LEVAQUIN) 500 mg tablet, TAKE 1 TABLET BY MOUTH EVERY DAY FOR 10 DAYS, Disp: , Rfl:   •  levothyroxine 88 mcg tablet, TAKE 1 TABLET EVERY DAY BY ORAL ROUTE , Disp: , Rfl:   •  lidocaine (LMX) 4 % cream, APPLY 1 APPLICATION 3 TIMES A DAY BY TOPICAL ROUTE AS NEEDED , Disp: , Rfl:   •  loratadine (CLARITIN) 10 mg tablet, Take 10 mg by mouth daily, Disp: , Rfl:   •  metFORMIN (GLUCOPHAGE) 500 mg tablet, TAKE 1 TABLET TWICE A DAY BY ORAL ROUTE , Disp: , Rfl:   •  omeprazole (PriLOSEC) 40 MG capsule, Take 40 mg by mouth every morning, Disp: , Rfl:   •  OneTouch Ultra test strip, USE AS DIRECTED FOR 90 DAYS TEST DAILY, Disp: , Rfl:   •  predniSONE 20 mg tablet, TAKE 1 TABLET BY MOUTH EVERY DAY FOR 5 DAYS, Disp: , Rfl:   •  promethazine-dextromethorphan (PHENERGAN-DM) 6 25-15 mg/5 mL oral syrup, TAKE 5ML BY MOUTH EVERY 6 HOURS AS NEEDED FOR 7 DAYS, Disp: , Rfl:   •  Tussin DM Max Adult 5-100 MG/5ML LIQD, TAKE 10 ML 3 TIMES A DAY BY ORAL ROUTE AS NEEDED , Disp: , Rfl:     No Known Allergies    Objective:  Vitals:    03/01/23 0837   BP: 122/72   Pulse: 60   Temp: 98 °F (36 7 °C)       Body mass index is 30 79 kg/m²  Right Knee Exam     Tenderness   The patient is experiencing tenderness in the medial joint line and patella      Range of Motion   Extension: 0   Flexion: 120     Tests Varus: negative Valgus: negative  Drawer:  Anterior - negative    Posterior - negative    Other   Erythema: absent  Scars: absent  Sensation: normal  Pulse: present  Swelling: none  Effusion: effusion (scant) present    Comments:  Stable at 0, 30 and 90 degrees  Neurovascularly in tact distally  No warmth or erythema  Parapatellar crepitance noted  Patellofemoral grind: positive      Left Knee Exam     Tenderness   The patient is experiencing tenderness in the medial joint line and patella  Range of Motion   Extension: 0   Flexion: 120     Tests   Varus: negative Valgus: negative  Drawer:  Anterior - negative     Posterior - negative    Other   Erythema: absent  Scars: absent  Sensation: normal  Pulse: present  Swelling: none  Effusion: effusion (scant) present    Comments:  Stable at 0, 30 and 90 degrees  Neurovascularly in tact distally  No warmth or erythema  Parapatellar crepitance noted  Patellofemoral grind: positive          Observations   Left Knee   Positive for effusion (scant)  Right Knee   Positive for effusion (scant)  Physical Exam  Vitals and nursing note reviewed  Constitutional:       Appearance: Normal appearance  She is well-developed  HENT:      Head: Normocephalic and atraumatic  Right Ear: External ear normal       Left Ear: External ear normal    Eyes:      General: No scleral icterus  Extraocular Movements: Extraocular movements intact  Conjunctiva/sclera: Conjunctivae normal    Cardiovascular:      Rate and Rhythm: Normal rate  Pulmonary:      Effort: Pulmonary effort is normal  No respiratory distress  Musculoskeletal:      Cervical back: Normal range of motion and neck supple  Right knee: Effusion (scant) present  Left knee: Effusion (scant) present  Comments: See Ortho exam   Skin:     General: Skin is warm and dry  Neurological:      Mental Status: She is alert and oriented to person, place, and time     Psychiatric:         Behavior: Behavior normal          I have personally reviewed pertinent films in PACS  Bilateral knee x-rays obtained on 3/1/2023 reviewed demonstrating severe degenerative change in a varus pattern with medial narrowing  There is tricompartmental sclerosis and osteophytosis  There is no acute fracture, dislocation, lytic or blastic lesion  The patient was counseled in detail regarding the diagnosis, the treatment options available, the prognosis of each treatment option, the potential risks and complications  These are, but are not limited to; deep vein thrombosis, pulmonary embolism, neurologic and vascular injury, infection, instability, leg length discrepancy, dislocation, hematoma, reflex sympathetic dystrophy, loss of range of motion, ankylosis of the knee, fracture, screw or prosthetic perforation, chronic pain, acute pain, chronic leg pain and edema, loosening, death, heart attack, and stroke  The patient's questions were answered in detail  The patient demonstrates understanding of these risks and wishes to proceed with surgery  This document was created using speech voice recognition software  Grammatical errors, random word insertions, pronoun errors, and incomplete sentences are an occasional consequence of this system due to software limitations, ambient noise, and hardware issues  Any formal questions or concerns about content, text, or information contained within the body of this dictation should be directly addressed to the provider for clarification

## 2023-03-10 ENCOUNTER — CONSULT (OUTPATIENT)
Dept: CARDIOLOGY CLINIC | Facility: CLINIC | Age: 58
End: 2023-03-10

## 2023-03-10 ENCOUNTER — TELEPHONE (OUTPATIENT)
Dept: CARDIOLOGY CLINIC | Facility: CLINIC | Age: 58
End: 2023-03-10

## 2023-03-10 ENCOUNTER — HOSPITAL ENCOUNTER (OUTPATIENT)
Dept: RADIOLOGY | Facility: HOSPITAL | Age: 58
Discharge: HOME | End: 2023-03-10

## 2023-03-10 ENCOUNTER — APPOINTMENT (OUTPATIENT)
Dept: LAB | Facility: HOSPITAL | Age: 58
End: 2023-03-10

## 2023-03-10 VITALS
WEIGHT: 182 LBS | BODY MASS INDEX: 30.32 KG/M2 | OXYGEN SATURATION: 100 % | HEIGHT: 65 IN | SYSTOLIC BLOOD PRESSURE: 130 MMHG | DIASTOLIC BLOOD PRESSURE: 84 MMHG | HEART RATE: 61 BPM

## 2023-03-10 DIAGNOSIS — M25.562 LEFT KNEE PAIN, UNSPECIFIED CHRONICITY: ICD-10-CM

## 2023-03-10 DIAGNOSIS — M25.562 CHRONIC PAIN OF BOTH KNEES: ICD-10-CM

## 2023-03-10 DIAGNOSIS — M25.561 RIGHT KNEE PAIN, UNSPECIFIED CHRONICITY: ICD-10-CM

## 2023-03-10 DIAGNOSIS — E66.9 OBESITY (BMI 30-39.9): ICD-10-CM

## 2023-03-10 DIAGNOSIS — I11.9 HYPERTENSIVE HEART DISEASE WITHOUT HEART FAILURE: ICD-10-CM

## 2023-03-10 DIAGNOSIS — E11.9 TYPE 2 DIABETES MELLITUS WITHOUT COMPLICATION, WITHOUT LONG-TERM CURRENT USE OF INSULIN (HCC): ICD-10-CM

## 2023-03-10 DIAGNOSIS — K62.89 CHRONIC IDIOPATHIC ANAL PAIN: ICD-10-CM

## 2023-03-10 DIAGNOSIS — Z01.810 PRE-OPERATIVE CARDIOVASCULAR EXAMINATION: ICD-10-CM

## 2023-03-10 DIAGNOSIS — M17.0 PRIMARY OSTEOARTHRITIS OF BOTH KNEES: ICD-10-CM

## 2023-03-10 DIAGNOSIS — G89.29 CHRONIC PAIN OF BOTH KNEES: ICD-10-CM

## 2023-03-10 DIAGNOSIS — R06.09 DYSPNEA ON EXERTION: ICD-10-CM

## 2023-03-10 DIAGNOSIS — M25.561 CHRONIC PAIN OF BOTH KNEES: ICD-10-CM

## 2023-03-10 DIAGNOSIS — E03.9 HYPOTHYROIDISM, UNSPECIFIED TYPE: ICD-10-CM

## 2023-03-10 DIAGNOSIS — R94.31 ABNORMAL EKG: ICD-10-CM

## 2023-03-10 DIAGNOSIS — E78.5 DYSLIPIDEMIA: Primary | ICD-10-CM

## 2023-03-10 DIAGNOSIS — Z82.49 FAMILY HISTORY OF EARLY CAD: ICD-10-CM

## 2023-03-10 DIAGNOSIS — E78.5 DYSLIPIDEMIA: ICD-10-CM

## 2023-03-10 DIAGNOSIS — G89.29 CHRONIC IDIOPATHIC ANAL PAIN: ICD-10-CM

## 2023-03-10 LAB
APTT PPP: 26 SECONDS (ref 23–37)
BASOPHILS # BLD AUTO: 0.07 THOUSANDS/ÂΜL (ref 0–0.1)
BASOPHILS NFR BLD AUTO: 1 % (ref 0–1)
EOSINOPHIL # BLD AUTO: 0.28 THOUSAND/ÂΜL (ref 0–0.61)
EOSINOPHIL NFR BLD AUTO: 3 % (ref 0–6)
ERYTHROCYTE [DISTWIDTH] IN BLOOD BY AUTOMATED COUNT: 13.6 % (ref 11.6–15.1)
HCT VFR BLD AUTO: 38 % (ref 34.8–46.1)
HGB BLD-MCNC: 12.5 G/DL (ref 11.5–15.4)
IMM GRANULOCYTES # BLD AUTO: 0.03 THOUSAND/UL (ref 0–0.2)
IMM GRANULOCYTES NFR BLD AUTO: 0 % (ref 0–2)
INR PPP: 0.95 (ref 0.84–1.19)
LYMPHOCYTES # BLD AUTO: 2.01 THOUSANDS/ÂΜL (ref 0.6–4.47)
LYMPHOCYTES NFR BLD AUTO: 24 % (ref 14–44)
MCH RBC QN AUTO: 30.6 PG (ref 26.8–34.3)
MCHC RBC AUTO-ENTMCNC: 32.9 G/DL (ref 31.4–37.4)
MCV RBC AUTO: 93 FL (ref 82–98)
MONOCYTES # BLD AUTO: 0.69 THOUSAND/ÂΜL (ref 0.17–1.22)
MONOCYTES NFR BLD AUTO: 8 % (ref 4–12)
NEUTROPHILS # BLD AUTO: 5.23 THOUSANDS/ÂΜL (ref 1.85–7.62)
NEUTS SEG NFR BLD AUTO: 64 % (ref 43–75)
NRBC BLD AUTO-RTO: 0 /100 WBCS
PLATELET # BLD AUTO: 414 THOUSANDS/UL (ref 149–390)
PMV BLD AUTO: 10.3 FL (ref 8.9–12.7)
PROTHROMBIN TIME: 12.8 SECONDS (ref 11.6–14.5)
RBC # BLD AUTO: 4.08 MILLION/UL (ref 3.81–5.12)
WBC # BLD AUTO: 8.31 THOUSAND/UL (ref 4.31–10.16)

## 2023-03-10 RX ORDER — ATORVASTATIN CALCIUM 10 MG/1
10 TABLET, FILM COATED ORAL
Qty: 90 TABLET | Refills: 3 | Status: SHIPPED | OUTPATIENT
Start: 2023-03-10

## 2023-03-10 NOTE — TELEPHONE ENCOUNTER
Dr Jo Bejarano reviewed labs performed on 2/10/23  He recommends okay with Lipitor 10 mg  Pt agreed and requested medication to be sent to her pharmacy  Scanned labs/instructions to patient chart

## 2023-03-10 NOTE — PROGRESS NOTES
Consultation - Cardiology Office  [facility]  Santiago Brooks 59 y o  female MRN: [id number]  : 1965  Unit/Bed#:  Encounter: [id number]      Assessment:     1  Hypertensive heart disease without heart failure    2  Dyspnea on exertion    3  Dyslipidemia    4  Type 2 diabetes mellitus without complication, without long-term current use of insulin ([address] )    5  Hypothyroidism, unspecified type    6  Pre-operative cardiovascular examination    7  Primary osteoarthritis of both knees    8  Chronic pain of both knees    9  Obesity (BMI 30-39 9)    10  Family history of early CAD    6  Abnormal EKG        Discussion summary and Plan:    1  Abnormal EKG with family history of premature coronary artery disease  Patient's EKG shows T wave versions in lateral leads  Cannot rule out ischemia  She had a strong family Siv heart disease with sister dying of sudden cardiac death at age 58 and mother had congestive heart failure  She is not very active she will be scheduled for echo Doppler and Lexiscan stress test    2  Dyspnea on exertion not much active at this time Lexiscan stress test and echo ordered  3   Dyslipidemia continue statins    4  Hypertensive heart disease with no evidence of heart failure  She is on chlorothiazide and amlodipine  Blood pressure is acceptable  5   Preoperative cardiovascular semination  Patient has abnormal EKG, history of sudden cardiac death in the family as well as multiple risk factors she will be scheduled for echo Doppler and Lexiscan stress test     6   Chronic bilateral both knee pain with osteoarthritis  Management as per orthopedics    7  Obesity with a BMI around 30  Cannot do exercise    8  Hypothyroidism  On levothyroxine  9   Family history of premature coronary artery disease    Continue current cardiac medications  Further plan results of echo and stress test become available        Patient / Genette Jose was advised and educated to call our office  immediately if  patient has any new symptoms of chest pain/shortness of breath, near-syncope, syncope, light headedness sustained palpitations or any other cardiovascular symptoms before their scheduled follow-up appointment  Office number was provided #[phone]  Thank you for your consultation  If you have any question please call me at [phone]    Counseling :  A description of the counseling  Goals and Barriers  Patient's ability to self care: Yes  Medication side effect reviewed with patient in detail and all their questions answered to their satisfaction  Primary Care Physician Requesting Consult: Mehul Barnes MD    Reason for Consult / Principal Problem: Preoperative clearance and multiple cardiac risk factors        HPI :     Cece Tovar is a 59y o  year old female who was referred by primary care doctor for preoperative clearance and she has multiple cardiac risk factors  Patient was medical history significant for pretension, hypothyroidism, diabetes mellitus, dyslipidemia, obesity with a BMI around 30 who was noted to have difficulty in walking and some exertional shortness of breath when she does her activities  She now needs preoperative clearance  Her EKG shows T wave inversions in lateral precordial leads  She had a family history of heart disease her mother had congestive heart failure her sister suddenly  of heart attack possibly as a sudden cardiac death at age of 58  She was diagnosed to have diabetes mellitus but she is on statins and blood pressure medications  She is not very active due to her knee issues  No other recent surgery    Review of Systems   Constitutional: Negative for activity change, chills, diaphoresis, fever and unexpected weight change  HENT: Negative for congestion  Eyes: Negative for discharge and redness  Respiratory: Positive for shortness of breath  Negative for cough, chest tightness and wheezing      Cardiovascular: Negative  Negative for chest pain, palpitations and leg swelling  Gastrointestinal: Negative for abdominal pain, diarrhea and nausea  Endocrine: Negative  Genitourinary: Negative for decreased urine volume and urgency  Musculoskeletal: Positive for arthralgias and gait problem  Negative for back pain  Skin: Negative for rash and wound  Allergic/Immunologic: Negative  Neurological: Negative for dizziness, seizures, syncope, weakness, light-headedness and headaches  Hematological: Negative  Psychiatric/Behavioral: Negative for agitation and confusion  The patient is not nervous/anxious  Historical Information   Past Medical History:   Diagnosis Date   • Depression 2016   • Diabetes mellitus [facility]) december 2022   • Disease of thyroid gland 2005   • Hypertension june 2022   • Osteoarthritis 2013     No past surgical history on file    Social History     Substance and Sexual Activity   Alcohol Use Yes   • Alcohol/week: 1 0 standard drink   • Types: 1 Glasses of wine per week    Comment: social drinking per month     Social History     Substance and Sexual Activity   Drug Use Never     Social History     Tobacco Use   Smoking Status Never   Smokeless Tobacco Never     Family History:   Family History   Problem Relation Age of Onset   • Diabetes Mother    • Cancer Maternal Aunt         lung cancer       Meds/Allergies     No Known Allergies    Current Outpatient Medications:   •  albuterol (PROVENTIL HFA,VENTOLIN HFA) 90 mcg/act inhaler, INHALE 2 PUFFS BY MOUTH FOUR TIMES A DAY FOR 30 DAYS, Disp: , Rfl:   •  amLODIPine (NORVASC) 5 mg tablet, Take 5 mg by mouth daily, Disp: , Rfl:   •  ascorbic acid (VITAMIN C) 500 MG tablet, Take 1 tablet (500 mg total) by mouth 2 (two) times a day, Disp: 60 tablet, Rfl: 0  •  atorvastatin (LIPITOR) 20 mg tablet, TAKE 1 TABLET EVERY DAY BY ORAL ROUTE , Disp: , Rfl:   •  Blood Glucose Monitoring Suppl (ONE TOUCH ULTRA 2) w/Device KIT, USE AS DIRECTED FOR 30 DAYS, Disp: , Rfl:   •  Calcium Carbonate-Vitamin D3 600-400 MG-UNIT TABS, Take 1 tablet by mouth 2 (two) times a day, Disp: , Rfl:   •  cholecalciferol (VITAMIN D3) 1,000 units tablet, Take 1 tablet (1,000 Units total) by mouth daily, Disp: 30 tablet, Rfl: 0  •  D3 High Potency 25 MCG (1000 UT) capsule, Take 1,000 Units by mouth daily, Disp: , Rfl:   •  Diclofenac Sodium (VOLTAREN) 1 %, APPLY 2 GRAMS TO THE AFFECTED AREAS BY TOPICAL ROUTE 4 TIMES PER DAY, Disp: , Rfl:   •  estradiol (ESTRACE) 0 1 mg/g vaginal cream, APPLY 2G INTRAVAGINALLY THREE TIMES A WEEK AT BEDTIME FOR 2 WEEKS, THEN TWICE A WEEK AT BEDTIME FOR 2 WEEKS, AND THEN ONE TIME A WEEK THEREA, Disp: , Rfl:   •  famotidine (PEPCID) 20 mg tablet, TAKE 1 TABLET BY MOUTH EVERY EVENING IF NEEDED, Disp: , Rfl:   •  ferrous sulfate 324 (65 Fe) mg, Take 1 tablet (324 mg total) by mouth daily before breakfast, Disp: 30 tablet, Rfl: 0  •  fluticasone (FLONASE) 50 mcg/act nasal spray, SPRAY 2 SPRAYS INTO EACH NOSTRIL AT BEDTIME, Disp: , Rfl:   •  folic acid (FOLVITE) 1 mg tablet, Take 1 tablet (1 mg total) by mouth daily, Disp: 30 tablet, Rfl: 0  •  hydrochlorothiazide (HYDRODIURIL) 12 5 mg tablet, Take 12 5 mg by mouth daily, Disp: , Rfl:   •  levofloxacin (LEVAQUIN) 500 mg tablet, TAKE 1 TABLET BY MOUTH EVERY DAY FOR 10 DAYS, Disp: , Rfl:   •  levothyroxine 88 mcg tablet, TAKE 1 TABLET EVERY DAY BY ORAL ROUTE , Disp: , Rfl:   •  lidocaine (LMX) 4 % cream, APPLY 1 APPLICATION 3 TIMES A DAY BY TOPICAL ROUTE AS NEEDED , Disp: , Rfl:   •  loratadine (CLARITIN) 10 mg tablet, Take 10 mg by mouth daily, Disp: , Rfl:   •  metFORMIN (GLUCOPHAGE) 500 mg tablet, TAKE 1 TABLET TWICE A DAY BY ORAL ROUTE , Disp: , Rfl:   •  omeprazole (PriLOSEC) 40 MG capsule, Take 40 mg by mouth every morning, Disp: , Rfl:   •  OneTouch Ultra test strip, USE AS DIRECTED FOR 90 DAYS TEST DAILY, Disp: , Rfl:   •  predniSONE 20 mg tablet, TAKE 1 TABLET BY MOUTH EVERY DAY FOR 5 DAYS, Disp: , Rfl:   • promethazine-dextromethorphan (PHENERGAN-DM) 6 25-15 mg/5 mL oral syrup, TAKE 5ML BY MOUTH EVERY 6 HOURS AS NEEDED FOR 7 DAYS, Disp: , Rfl:   •  Tussin DM Max Adult 5-100 MG/5ML LIQD, TAKE 10 ML 3 TIMES A DAY BY ORAL ROUTE AS NEEDED , Disp: , Rfl:   •  zinc sulfate (ZINCATE) 220 mg capsule, Take 1 capsule (220 mg total) by mouth daily, Disp: 30 capsule, Rfl: 0    Vitals: Blood pressure 130/84, pulse 61, height 5' 5" (1 651 m), weight 82 6 kg (182 lb), SpO2 100 %  ?  Body mass index is 30 29 kg/m²  Wt Readings from Last 3 Encounters:   03/10/23 82 6 kg (182 lb)   03/01/23 83 9 kg (185 lb)   02/17/23 86 2 kg (190 lb)     Vitals:    03/10/23 1252   Weight: 82 6 kg (182 lb)     BP Readings from Last 3 Encounters:   03/10/23 130/84   03/01/23 122/72   02/17/23 145/80         Physical Exam    Neurologic:  Alert & oriented x 3, no new focal deficits, Not in any acute distress,  Constitutional:  Adequate built, non-toxic appearance   Neck: Normal range of motion, no tenderness,  Neck supple   Respiratory:  Bilateral air entry, mostly clear to auscultation  Cardiovascular: S1-S2 regular with a 2/6 ejection systolic murmur and S4 is present  GI:  Soft, nondistended,  nontender, no hepatosplenomegaly appreciated  Musculoskeletal:  No edema, no tenderness, no deformities  Skin:  Well hydrated, no rash   Extremities:  No edema and distal pulses are present  Psychiatric:  Speech and behavior appropriate     Diagnostic Studies Review Cardio:  None recently    EKG:  Twelve-lead EKG done on 3/10/2023 normal sinus rhythm heart rate 61 bpm with a T wave normality in lateral precordial leads cannot rule out ischemia  No old EKG for comparison  Dr Adam Angel MD [facility]      "This note was completed in part utilizing [employer] direct voice recognition software     Grammatical errors, random word insertion, spelling mistakes, and incomplete sentences may be an occasional consequence of the system secondary to software limitations, ambient noise and hardware issues  Please read the chart carefully and recognize, using context, where substitutions have occurred    If you have any questions or concerns about the context, text or information contained within the body of this dictation, please contact myself, the provider, for further clarification "

## 2023-03-11 LAB
EST. AVERAGE GLUCOSE BLD GHB EST-MCNC: 126 MG/DL
HBA1C MFR BLD: 6 %

## 2023-03-12 LAB — MRSA NOSE QL CULT: NORMAL

## 2023-03-14 ENCOUNTER — TELEPHONE (OUTPATIENT)
Dept: OBGYN CLINIC | Facility: HOSPITAL | Age: 58
End: 2023-03-14

## 2023-03-14 DIAGNOSIS — M17.11 PRIMARY OSTEOARTHRITIS OF RIGHT KNEE: Primary | ICD-10-CM

## 2023-03-14 NOTE — TELEPHONE ENCOUNTER
Preoperative Elective Admission Assessment    Living Situation:    Who does pt live with: lives alone  What kind of home: multi-level  How do they enter the home: front  How many levels in home: 2   # of steps to enter home: 2  # of steps to second floor: 17  Are there handrails: Yes  Are there landings: No  Sleeping arrangement: second floor  Where is Bathroom: 1/2 bath on entry level  Where is the tub or shower: step in bath tub on second floor without grab bars or shower chair  Dogs or ther pets: n/a     First Floor Setup:   Is there a bathroom: Yes  Where would pt sleep: plans to stay on second floot     DME: n/a     Patient's Current Level of Function: Ambulates: Independently and ADLs: Independent    Post-op Caregiver: child  Caregiver Name and phone number for Inpatient discharge needs: Slim Lilly, [phone]  Currently receive any HHC/aides/community supports: No     Post-op Transport: child  To/from hospital: child  To/from PT 2-3x/week: child  Uses community transport now: No     Outpatient Physical Therapy Site:  Site: [facility]  pre and post-op appts scheduled? Yes     Medication Management: self and out of bottle  Preferred Pharmacy for Post-op Medications: [facility]  Blood Management Vitamin Regimen: Pt confirms taking as prescribed  Post-op anticoagulant: to be determined by surgical team postoperatively     DC Plan: Pt plans to be discharged home      Barriers to DC identified preoperatively: none identified    BMI: 30 29    Enrolled in [employer]    Patient Education:  Pt educated on post-op pain, early mobilization (POD0), Average inpt LOS, OP PT goal   Patient educated that our goal is to appropriately discharge patient based off their post-op function while striving to maintain maximal independence  The goal is to discharge patient to home and for them to attend outpatient physical therapy      Assigned to care team? Yes

## 2023-03-15 PROBLEM — M17.11 PRIMARY OSTEOARTHRITIS OF RIGHT KNEE: Status: ACTIVE | Noted: 2023-03-15

## 2023-03-15 LAB
DME PARACHUTE DELIVERY DATE REQUESTED: NORMAL
DME PARACHUTE ITEM DESCRIPTION: NORMAL
DME PARACHUTE ITEM DESCRIPTION: NORMAL
DME PARACHUTE ORDER STATUS: NORMAL
DME PARACHUTE SUPPLIER NAME: NORMAL
DME PARACHUTE SUPPLIER PHONE: NORMAL

## 2023-03-20 LAB
DME PARACHUTE DELIVERY DATE ACTUAL: NORMAL
DME PARACHUTE DELIVERY DATE REQUESTED: NORMAL
DME PARACHUTE ITEM DESCRIPTION: NORMAL
DME PARACHUTE ITEM DESCRIPTION: NORMAL
DME PARACHUTE ORDER STATUS: NORMAL
DME PARACHUTE SUPPLIER NAME: NORMAL
DME PARACHUTE SUPPLIER PHONE: NORMAL

## 2023-03-22 ENCOUNTER — HOSPITAL ENCOUNTER (OUTPATIENT)
Dept: RADIOLOGY | Facility: HOSPITAL | Age: 58
Discharge: HOME | End: 2023-03-22
Attending: INTERNAL MEDICINE

## 2023-03-22 ENCOUNTER — TELEPHONE (OUTPATIENT)
Dept: CARDIOLOGY CLINIC | Facility: CLINIC | Age: 58
End: 2023-03-22

## 2023-03-22 ENCOUNTER — APPOINTMENT (OUTPATIENT)
Dept: LAB | Facility: HOSPITAL | Age: 58
End: 2023-03-22
Attending: ORTHOPAEDIC SURGERY

## 2023-03-22 ENCOUNTER — HOSPITAL ENCOUNTER (OUTPATIENT)
Dept: NON INVASIVE DIAGNOSTICS | Facility: HOSPITAL | Age: 58
Discharge: HOME | End: 2023-03-22
Attending: INTERNAL MEDICINE

## 2023-03-22 VITALS
BODY MASS INDEX: 30.32 KG/M2 | DIASTOLIC BLOOD PRESSURE: 84 MMHG | SYSTOLIC BLOOD PRESSURE: 130 MMHG | HEART RATE: 59 BPM | WEIGHT: 182 LBS | HEIGHT: 65 IN

## 2023-03-22 DIAGNOSIS — E78.5 DYSLIPIDEMIA: ICD-10-CM

## 2023-03-22 DIAGNOSIS — R94.31 ABNORMAL EKG: ICD-10-CM

## 2023-03-22 DIAGNOSIS — I11.9 HYPERTENSIVE HEART DISEASE WITHOUT HEART FAILURE: ICD-10-CM

## 2023-03-22 DIAGNOSIS — Z82.49 FAMILY HISTORY OF EARLY CAD: ICD-10-CM

## 2023-03-22 DIAGNOSIS — Z01.810 PRE-OPERATIVE CARDIOVASCULAR EXAMINATION: ICD-10-CM

## 2023-03-22 DIAGNOSIS — R06.09 DYSPNEA ON EXERTION: ICD-10-CM

## 2023-03-22 DIAGNOSIS — E11.9 TYPE 2 DIABETES MELLITUS WITHOUT COMPLICATION, WITHOUT LONG-TERM CURRENT USE OF INSULIN (HCC): ICD-10-CM

## 2023-03-22 DIAGNOSIS — M25.561 CHRONIC PAIN OF BOTH KNEES: ICD-10-CM

## 2023-03-22 DIAGNOSIS — M25.562 CHRONIC PAIN OF BOTH KNEES: ICD-10-CM

## 2023-03-22 DIAGNOSIS — M17.0 PRIMARY OSTEOARTHRITIS OF BOTH KNEES: ICD-10-CM

## 2023-03-22 DIAGNOSIS — G89.29 CHRONIC PAIN OF BOTH KNEES: ICD-10-CM

## 2023-03-22 DIAGNOSIS — E66.9 OBESITY (BMI 30-39.9): ICD-10-CM

## 2023-03-22 DIAGNOSIS — E03.9 HYPOTHYROIDISM, UNSPECIFIED TYPE: ICD-10-CM

## 2023-03-22 LAB
ALBUMIN SERPL BCP-MCNC: 4.3 G/DL (ref 3.5–5)
ALP SERPL-CCNC: 45 U/L (ref 34–104)
ALT SERPL W P-5'-P-CCNC: 17 U/L (ref 7–52)
ANION GAP SERPL CALCULATED.3IONS-SCNC: 7 MMOL/L (ref 4–13)
AST SERPL W P-5'-P-CCNC: 14 U/L (ref 13–39)
BILIRUB SERPL-MCNC: 0.39 MG/DL (ref 0.2–1)
BUN SERPL-MCNC: 11 MG/DL (ref 5–25)
CALCIUM SERPL-MCNC: 9.1 MG/DL (ref 8.4–10.2)
CHEST PAIN STATEMENT: NORMAL
CHLORIDE SERPL-SCNC: 106 MMOL/L (ref 96–108)
CO2 SERPL-SCNC: 27 MMOL/L (ref 21–32)
CREAT SERPL-MCNC: 0.71 MG/DL (ref 0.6–1.3)
GFR SERPL CREATININE-BSD FRML MDRD: 93 ML/MIN/1.73SQ M
GLUCOSE P FAST SERPL-MCNC: 110 MG/DL (ref 65–99)
MAX DIASTOLIC BP: 80 MMHG
MAX HEART RATE: 102 BPM
MAX HR PERCENT: 63 %
MAX HR: 102 BPM
MAX PREDICTED HEART RATE: 161 BPM
MAX. SYSTOLIC BP: 140 MMHG
NUC STRESS EJECTION FRACTION: 75 %
POTASSIUM SERPL-SCNC: 4 MMOL/L (ref 3.5–5.3)
PROT SERPL-MCNC: 7.2 G/DL (ref 6.4–8.4)
PROTOCOL NAME: NORMAL
RATE PRESSURE PRODUCT: NORMAL
REASON FOR TERMINATION: NORMAL
SL CV REST NUCLEAR ISOTOPE DOSE: 10.76 MCI
SL CV STRESS NUCLEAR ISOTOPE DOSE: 32.1 MCI
SL CV STRESS RECOVERY BP: NORMAL MMHG
SL CV STRESS RECOVERY HR: 62 BPM
SL CV STRESS RECOVERY O2 SAT: 100 %
SODIUM SERPL-SCNC: 140 MMOL/L (ref 135–147)
STRESS ANGINA INDEX: 0
STRESS BASELINE BP: NORMAL MMHG
STRESS BASELINE HR: 57 BPM
STRESS O2 SAT REST: 100 %
STRESS PEAK HR: 99 BPM
STRESS POST ESTIMATED WORKLOAD: 10 METS
STRESS POST EXERCISE DUR MIN: 3 MIN
STRESS POST O2 SAT PEAK: 100 %
STRESS POST PEAK BP: 120 MMHG
STRESS/REST PERFUSION RATIO: 1.2
TARGET HR FORMULA: NORMAL
TEST INDICATION: NORMAL
TIME IN EXERCISE PHASE: NORMAL

## 2023-03-22 RX ADMIN — REGADENOSON 0.4 MG: 0.08 INJECTION, SOLUTION INTRAVENOUS at 09:24

## 2023-03-22 NOTE — TELEPHONE ENCOUNTER
LESLIE patient, made aware of results  f [phone]   Attn Dr Marky Whipple  Patient requested ekg and stress test be faxed  I sent both via rightfax to fax provided

## 2023-03-22 NOTE — TELEPHONE ENCOUNTER
----- Message from Ramandeep Valencia MD sent at 3/22/2023  3:37 PM EDT -----  Pt's Patient's stress test is normal    Patient can keep regular appointment  Please call patient with the result

## 2023-03-23 ENCOUNTER — TELEPHONE (OUTPATIENT)
Dept: CARDIOLOGY CLINIC | Facility: CLINIC | Age: 58
End: 2023-03-23

## 2023-03-23 LAB
AORTIC ROOT: 2.8 CM
APICAL FOUR CHAMBER EJECTION FRACTION: 68 %
AV LVOT PEAK GRADIENT: 5 MMHG
AV PEAK GRADIENT: 7 MMHG
DOP CALC LVOT AREA: 2.54 CM2
DOP CALC LVOT DIAMETER: 1.8 CM
E WAVE DECELERATION TIME: 167 MS
FRACTIONAL SHORTENING: 38 (ref 28–44)
INTERVENTRICULAR SEPTUM IN DIASTOLE (PARASTERNAL SHORT AXIS VIEW): 1 CM
INTERVENTRICULAR SEPTUM: 1 CM (ref 0.6–1.1)
LAAS-AP2: 19.6 CM2
LAAS-AP4: 19.3 CM2
LEFT ATRIUM SIZE: 3.7 CM
LEFT INTERNAL DIMENSION IN SYSTOLE: 2.5 CM (ref 2.1–4)
LEFT VENTRICULAR INTERNAL DIMENSION IN DIASTOLE: 4 CM (ref 3.5–6)
LEFT VENTRICULAR POSTERIOR WALL IN END DIASTOLE: 0.9 CM
LEFT VENTRICULAR STROKE VOLUME: 49 ML
LVSV (TEICH): 49 ML
MV E'TISSUE VEL-SEP: 8 CM/S
MV PEAK A VEL: 0.62 M/S
MV PEAK E VEL: 90 CM/S
MV STENOSIS PRESSURE HALF TIME: 48 MS
MV VALVE AREA P 1/2 METHOD: 4.58
RIGHT ATRIUM AREA SYSTOLE A4C: 11 CM2
RIGHT VENTRICLE ID DIMENSION: 3.1 CM
SL CV LEFT ATRIUM LENGTH A2C: 5 CM
SL CV LV EF: 69
SL CV PED ECHO LEFT VENTRICLE DIASTOLIC VOLUME (MOD BIPLANE) 2D: 70 ML
SL CV PED ECHO LEFT VENTRICLE SYSTOLIC VOLUME (MOD BIPLANE) 2D: 21 ML
TR MAX PG: 32 MMHG
TRICUSPID ANNULAR PLANE SYSTOLIC EXCURSION: 1.9 CM
TRICUSPID VALVE PEAK REGURGITATION VELOCITY: 2.81 M/S

## 2023-03-23 NOTE — TELEPHONE ENCOUNTER
----- Message from Francy Deluca MD sent at 3/23/2023 12:20 PM EDT -----  Patient's echo shows normal LV systolic function  No significant, to mild valvular disease  Patient can keep an appointment  Please call patient about echo report

## 2023-03-23 NOTE — TELEPHONE ENCOUNTER
Pre  Op  Clearance note- Cardiology    Ruth Bowers   59 y o   female  1965    DO Cecelia Juárez :     Patient's chart was reviewed for preop clearance  Patient was seen in our office on 3/10/2023  Patient has past medical history significant for hypertensive heart disease, dyspnea on exertion, type 2 diabetes mellitus, DJD, obesity, family history of coronary artery disease  Patient is now scheduled for Arthroplasty Knee 4/11/23  Patient has no clinical evidence of  active heart failure or  active ischemia or active arrhythmia  Patient's last cardiac workup including echo Doppler and nuclear stress test done in March 2023 reports were reviewed and it shows shows no ischemia normal LV systolic function  In my opinion patient is in optimum condition for the procedure as planned  Patient is low risk for the surgery as planned from cardiac point of view  Continue current cardiac medications  Patient can hold  Aspirin for  5-7 days as required for surgery  Patient can hold Plavix for 5 days  Patient can hold Eliquis/Xarelto/Pradaxa for 3 days before the procedure  Please restart after the procedure  immediately or next day if no contraindication form surgical point of view and advise patient to contact our office  If you have any question please do not hesitate to call us at our office of [facility]  Phone # [phone]        Lab Results   Component Value Date    WBC 8 31 03/10/2023    HGB 12 5 03/10/2023    HCT 38 0 03/10/2023    MCV 93 03/10/2023     (H) 03/10/2023     Lab Results   Component Value Date    CREATININE 0 71 03/22/2023     Lab Results   Component Value Date    GLUF 110 (H) 03/22/2023       Cardiac testing:   No results found for this or any previous visit  No results found for this or any previous visit  No results found for this or any previous visit  No results found for this or any previous visit      No results found "for this or any previous visit  No results found for this or any previous visit  Results for orders placed during the hospital encounter of 03/22/23    NM myocardial perfusion spect (rx stress and/or rest)    Interpretation Summary  •  Stress ECG: The stress ECG is equivocal for ischemia after vasodilation and low level exercise, without reproduction of symptoms  •  Stress ECG: Lungs, no wheezing noted on auscultation  A pharmacological stress test was performed using regadenoson  The patient after exercising for 3 min and had a maximal HR of 102 bpm (63 % of MPHR) and 10 0 METS  The patient experienced no angina during the test  The patient reached the end of the protocol  Low level exercise was used during the pharmacological stress test  The patient reported belly cramps during the stress test  Symptoms began during stress and ended during recovery  Blood pressure demonstrated a normal response and heart rate demonstrated a normal response to stress  The patient's heart rate recovery was normal   •  Perfusion Defect Conclusion: The stress/rest perfusion ratio is 1 20   There is evidence of transient ischemic dilation (TID)  TID was appreciated quantitatively but not visually  •  Stress Function: Left ventricular function post-stress is normal  Post-stress ejection fraction is 75 %  •  Perfusion: There are no major reversible perfusion defects when revieweing rest, stress, and prone imaging  •  Stress Combined Conclusion: Left ventricular perfusion is normal     Negative study for evidence of pharmacological induced ischemia or prior infarction  No major symptoms with vasodilation  Lower suspicion for balanced ischemia      DR Natalio Burdick MD [facility]  3/23/2023  5:06 PM      \"This note was completed in part utilizing m-modal fluency direct voice recognition software     Grammatical errors, random word insertion, spelling mistakes, and incomplete sentences may be an occasional consequence of the system " "secondary to software limitations, ambient noise and hardware issues  Please read the chart carefully and recognize, using context, where substitutions have occurred  If you have any questions or concerns about the context, text or information contained within the body of this dictation, please contact myself, the provider, for further clarification  \"  "

## 2023-03-27 DIAGNOSIS — M17.0 PRIMARY OSTEOARTHRITIS OF BOTH KNEES: ICD-10-CM

## 2023-03-28 RX ORDER — FERROUS SULFATE 325(65) MG
TABLET ORAL
Qty: 30 TABLET | Refills: 0 | Status: SHIPPED | OUTPATIENT
Start: 2023-03-28

## 2023-03-28 RX ORDER — ZINC SULFATE 50(220)MG
220 CAPSULE ORAL DAILY
Qty: 30 CAPSULE | Refills: 0 | Status: SHIPPED | OUTPATIENT
Start: 2023-03-28 | End: 2023-04-27

## 2023-03-28 RX ORDER — ASCORBIC ACID 500 MG
500 TABLET ORAL 2 TIMES DAILY
Qty: 60 TABLET | Refills: 0 | Status: SHIPPED | OUTPATIENT
Start: 2023-03-28 | End: 2023-04-27

## 2023-03-28 RX ORDER — FOLIC ACID 1 MG/1
1 TABLET ORAL DAILY
Qty: 30 TABLET | Refills: 0 | Status: SHIPPED | OUTPATIENT
Start: 2023-03-28 | End: 2023-04-27

## 2023-04-04 ENCOUNTER — EVALUATION (OUTPATIENT)
Dept: PHYSICAL THERAPY | Facility: CLINIC | Age: 58
End: 2023-04-04

## 2023-04-04 DIAGNOSIS — M17.12 PRIMARY OSTEOARTHRITIS OF LEFT KNEE: ICD-10-CM

## 2023-04-04 DIAGNOSIS — M17.11 PRIMARY OSTEOARTHRITIS OF RIGHT KNEE: Primary | ICD-10-CM

## 2023-04-04 DIAGNOSIS — Z01.818 PRE-OP EXAMINATION: ICD-10-CM

## 2023-04-04 NOTE — PROGRESS NOTES
PT Evaluation     Today's date: 2023  Patient name: Maximino Zuniga  : 1965  MRN: [id number]  Referring provider: Luís Comer DO  Dx:   Encounter Diagnosis     ICD-10-CM    1  Pre-op examination  Z01 818       2  Primary osteoarthritis of right knee  M17 11 Ambulatory referral to Physical Therapy      3  Primary osteoarthritis of left knee  M17 12 Ambulatory referral to Physical Therapy          Start Time: 0800  Stop Time: 0845  Total time in clinic (min): 45 minutes    Assessment  Assessment details: Patient, Maximino Zuniga, arrives to outpatient physical therapy with primary complaints of bilateral knee pain, Right LE greater than the Left LE, 1 week prior to undergoing R TKA  Cecelia presents with impaired R knee flexion AROM, tender to palpation, gait deviations, and decreased Right knee strength compared to the Left  Due to these impairments, she has limitations with ascending/descneding stairs with step to pattern, ambulating long distances, and driving  These limitations impact Cecelia to participate in self-care, ADL's, and recreational activities  Based on observation, special tests, and associated symptoms found on initial evaluation, they confirm the referring diagnosis of chronic osteoarthritis at bilateral knees  Patient education emphasized signs of DVT and infection post surgery, sequencing of ascending/descending stairs, prognosis, post-operative rehabilitative protocol, importance of nutrition prior to therapy, and HEP  Cecelia would benefit from skilled physical therapy to address their aforementioned impairments and limitations in order to return to prior level of functioning and maximize independent mobility       Impairments: abnormal coordination, abnormal gait, abnormal or restricted ROM, abnormal movement, activity intolerance, impaired balance, impaired physical strength, lacks appropriate home exercise program, pain with function, weight-bearing intolerance and poor body mechanics    Symptom irritability: lowUnderstanding of Dx/Px/POC: good   Prognosis: good    Goals  STG (2-6 weeks)  1  Patient will be independent with comprehensive HEP  2  Patient will demonstrate an increase in active knee ROM to WNL in order to improve gait  3  Patient will improve knee MMT by a score of 1 in order to perform ADL's  LTG (6-12 weeks)  1  Patient will be able to work a full shift with less than 3 out of 10 pain  2  Patient will ascend and descend full flight of stairs reciprocally and without increase in pain in order to complete ADL's    3  Patient will demonstrate full functional squat with proper knee mechanics with no reports of pain  4  Patient will return to their previous exercise level using corrected mechanics without increase in pain  Plan  Patient would benefit from: skilled physical therapy and PT eval  Planned modality interventions: cryotherapy, TENS and thermotherapy: hydrocollator packs  Planned therapy interventions: ADL retraining, abdominal trunk stabilization, activity modification, IADL retraining, joint mobilization, manual therapy, massage, motor coordination training, muscle pump exercises, neuromuscular re-education, patient education, ADL training, balance, balance/weight bearing training, body mechanics training, postural training, self care, coordination, strengthening, stretching, therapeutic activities, therapeutic exercise, flexibility, fine motor coordination training, gait training, graded activity, functional ROM exercises, graded exercise, graded motor, transfer training, therapeutic training and home exercise program  Frequency: 2-3x/week  Duration in weeks: 12  Treatment plan discussed with: patient        Subjective Evaluation    History of Present Illness  Mechanism of injury: Patient, Vicki Soliz, arrives to outpatient physical therapy with primary complaints bilateral knee pain, R greater than the L   She reports that she is undergoing a  R TKA on 23  Cecelia states that she has had significant knee pain since   She has previously seen orthopedist for series of steroid and gel injections in the Right knee  Cecelia states that she has sharp and burning pain with ambulation, especially greater than 2600 steps  She also reports that she has had significant pain with sleeping, driving, and ascending/descending stairs  She is unable to walk around her neighborhood, go on hikes, and go on vacations due to increased pain  Cecelia reports that she is very nervous about this surgery and the duration of recovery following surgery  She also reports that she has gone to skilled PT in the past but has felt that she was not given the necessary attention during her treatment sessions due to double booking patients  Cecelia has significant PMH for Type 2 Diabetes diagnosed in 2022  Recurrent probem    Quality of life: good    Pain  Current pain ratin  At best pain ratin  At worst pain ratin  Quality: burning, sharp, tight and discomfort  Relieving factors: rest and relaxation  Aggravating factors: standing, walking, stair climbing, running and sitting  Progression: worsening    Social Support  Steps to enter house: yes  2  17  Lives in: multiple-level home  Lives with: alone    Employment status: not working  Exercise history: Likes to go on hikes      Diagnostic Tests  X-ray: abnormal  Treatments  Previous treatment: physical therapy and injection treatment  Patient Goals  Patient goals for therapy: decreased pain, increased strength, independence with ADLs/IADLs, return to sport/leisure activities and increased motion          Objective     Tenderness   Left Knee   No tenderness in the lateral joint line, LCL (distal), LCL (proximal), MCL (distal), MCL (proximal) and medial joint line  Right Knee   Tenderness in the MCL (distal) and MCL (proximal)   No tenderness in the lateral joint line, LCL (distal), LCL (proximal) and "medial joint line  Active Range of Motion   Left Knee   Flexion: 132 degrees   Extension: 0 degrees     Right Knee   Flexion: 124 degrees with pain  Extension: -1 degrees with pain    Passive Range of Motion   Left Knee   Flexion: 138 degrees     Right Knee   Flexion: 126 degrees with pain    Mobility   Patellar Mobility:   Left Knee   WFL: medial and lateral    Hypomobile: left superior and left inferior    Right Knee   WFL: medial and lateral  Hypomobile: superior and inferior     Patellar Static Positioning   Left Knee: goldie  Right Knee: goldie    Strength/Myotome Testing     Left Hip   Planes of Motion   Flexion: 4+  Abduction: 4+  Adduction: 4+    Right Hip   Planes of Motion   Flexion: 4  Abduction: 4+  Adduction: 4+    Ambulation   Weight-Bearing Status   Weight-Bearing Status (Left): full weight bearing   Weight-Bearing Status (Right): full weight-bearing    Distance in feet: 200  Assistive device used: none    Ambulation: Level Surfaces   Ambulation with assistive device: independent  Ambulation without assistive device: independent    Observational Gait   Stride length within functional limits  Increased left stance time and left swing time  Decreased walking speed, right stance time and right swing time  Left foot contact pattern: foot flat  Right foot contact pattern: foot flat  Left arm swing: within functional limits  Right arm swing: within functional limits  Base of support: normal  Left stance time comments: minimal  Left swing time comments: minimal  Right stance time comments: minimal  Right swing time comments: minimal    Functional Assessment      Squat    Left within functional limits and right within functional limits  Forward Step Up 8\"   Left Leg  Pain and increased contralateral push off  Right Leg  Pain, increased forward trunk lean and increased contralateral push off       Forward Step Down 8\"   Left Leg  Pain and contralateral toe touch first      Right Leg  Pain and " contralateral toe touch first      Single Leg Stance   Left: 11 seconds  Right: 16 seconds    Comments  Pre Op: 23    5STS: 16s   TU s             Precautions: Type 2 Diabetes, future R TKA protocol   HEP ACCESS CODE: [id number]   IE Pre-Op: 23  Surgical Date: 23      Date    23   Visit Number     IE Pre Op   Manuals                                   Neuro Re-Ed       Patient Education        Quad sets                                          Ther Ex       Heel slides       Seated HS str       Ankle Pumps                                          Ther Activity                     Gait Training                     Modalities                           Patient treated by SIL Capellan under my direct supervision

## 2023-04-05 ENCOUNTER — PATIENT MESSAGE (OUTPATIENT)
Dept: OBGYN CLINIC | Facility: CLINIC | Age: 58
End: 2023-04-05

## 2023-04-11 PROBLEM — E03.9 HYPOTHYROIDISM: Status: ACTIVE | Noted: 2023-04-11

## 2023-04-11 PROBLEM — Z96.651 S/P TOTAL KNEE REPLACEMENT NOT USING CEMENT, RIGHT: Status: ACTIVE | Noted: 2023-04-11

## 2023-04-11 PROBLEM — E78.5 HYPERLIPIDEMIA: Status: ACTIVE | Noted: 2023-04-11

## 2023-04-11 PROBLEM — I10 HTN (HYPERTENSION): Status: ACTIVE | Noted: 2023-04-11

## 2023-04-11 PROBLEM — E11.9 DIABETES MELLITUS, TYPE 2 (HCC): Status: ACTIVE | Noted: 2023-04-11

## 2023-04-24 ENCOUNTER — OFFICE VISIT (OUTPATIENT)
Dept: PHYSICAL THERAPY | Facility: CLINIC | Age: 58
End: 2023-04-24

## 2023-04-24 DIAGNOSIS — Z96.651 STATUS POST RIGHT KNEE REPLACEMENT: ICD-10-CM

## 2023-04-24 DIAGNOSIS — M17.11 PRIMARY OSTEOARTHRITIS OF RIGHT KNEE: Primary | ICD-10-CM

## 2023-04-24 DIAGNOSIS — M17.12 PRIMARY OSTEOARTHRITIS OF LEFT KNEE: ICD-10-CM

## 2023-04-24 NOTE — PROGRESS NOTES
Daily Note     Today's date: 2023  Patient name: Aisha Ferguson  : 1965  MRN: [id number]  Referring provider: Mamta Barrett DO  Dx:   Encounter Diagnosis     ICD-10-CM    1  Primary osteoarthritis of right knee  M17 11       2  Primary osteoarthritis of left knee  M17 12       3  Status post right knee replacement  [id number]           Start Time: 0800  Stop Time: 0845  Total time in clinic (min): 45 minutes    Patient seen concurrently with another patient nearby  Subjective: Patient arrives to physical therapy with minimal complaints of pain since last session  She reports that she had trouble sleeping last night and had to take a dose of Oxy  Objective: See treatment diary below      Assessment: Patient tolerated today's treatment session well  Cecelia demonstrated increased AROM of the R knee in flexion since last session  She continues to require 50% PT assist for SLR as she exhibited moderate extension lag and difficulty with initiation of movement  Cecelia benefited from prone hamstring curls with quad stretch to reach available end range to gain increase R knee flexion to 103 degrees  Cecelia remains a good candidate for skilled physical therapy to progress R LE quad activation in open chain activities and increase weightbearing tolerance on R LE in order to promote independence for ADL's  Plan: Continue per plan of care  Progress treatment as tolerated  Progress treament per protocol            Precautions: Type 2 Diabetes, R TKA protocol   HEP ACCESS CODE: [id number]   IE Post-Op: 23  Surgical Date: 23      Date 2023   Visit Number  6 5 4 3  2 (Post Op)    Manuals        Knee PROM TG 99,-5 TG 94,-4 TG  92, -6 91, -5                            Neuro Re-Ed        Patient Education   TG      Quad sets 3x10 2s hold 3x10 2s hold 3x10 2s hold 3x10 2s hold 2x10    Gait training w/ SPC  6x50ft  5x50ft     Step up   4 inch step 2x10 w/ cane     Biodex weightshifting ML/AP 2 min ea       TKE 2x10 GTB               Ther Ex        Heel slides 3x10 2s hold 3x10 2s hold  3x10 2s hold  3x10  10x   Seated HS str 3x30s Long sitting  3x30s Long sitting  3x30s 3x30s    Ankle Pumps    2x10     SAQ 3x10  3x10  2x10  3x10     Log rolling  15x 2x10  3x10     SLR 10x  15x 2x10     Mini Squats 2x10 2x10       Hip adduction    2x10     Gastroc stretch  3x30s 3x30s      Heel Raises        Seated hip flexion    2x10      Prone Hamstring curls 2x10 w/ SOS str       Ther Activity                        Gait Training                        Modalities                                Insurance:  AMA/CMS Eval/ Re-eval POC expires Miguel Fisher #/ Referral # Total   Visits  Start date  Expiration date Extension  Visit limitation? PT only or  PT+OT? Co-Insurance   CMS 4/4/23 7/4/23  [id number]  4/4/23 8/4/23  BOMN PT No                                                                 AUTH #: [id number] Date 4/4 4/14 4/17 4/19 4/21 4/24         Visits  Authed: 12 Used 1 1 1 1 1 1          Remaining  12 11 10 9 8 7             Patient treated by SIL Curry under my direct supervision

## 2023-04-26 ENCOUNTER — OFFICE VISIT (OUTPATIENT)
Dept: OBGYN CLINIC | Facility: CLINIC | Age: 58
End: 2023-04-26

## 2023-04-26 ENCOUNTER — APPOINTMENT (OUTPATIENT)
Dept: RADIOLOGY | Facility: CLINIC | Age: 58
End: 2023-04-26

## 2023-04-26 VITALS
BODY MASS INDEX: 34.17 KG/M2 | SYSTOLIC BLOOD PRESSURE: 130 MMHG | HEART RATE: 60 BPM | HEIGHT: 61 IN | DIASTOLIC BLOOD PRESSURE: 74 MMHG | WEIGHT: 181 LBS

## 2023-04-26 DIAGNOSIS — Z96.651 S/P TOTAL KNEE REPLACEMENT NOT USING CEMENT, RIGHT: ICD-10-CM

## 2023-04-26 DIAGNOSIS — Z96.651 S/P TOTAL KNEE REPLACEMENT NOT USING CEMENT, RIGHT: Primary | ICD-10-CM

## 2023-04-26 NOTE — PROGRESS NOTES
Assessment/Plan:  1  S/P total knee replacement not using cement, right  XR knee 3 vw right non injury        Scribe Attestation    I,:  Blake Urias am acting as a scribe while in the presence of the attending physician :       I,:  Liliane Barboza, DO personally performed the services described in this documentation    as scribed in my presence :         Jian Espinosa presents today 2 weeks status post right total knee arthroplasty DOS 4/11/2023  Patient is doing well postoperatively  I am very pleased with her clinical and radiographic presentation in the office today  At this time, she may resume showering and allowing soap and water to run over surgical incision, pat dry  Continue to avoid submersion in water at this time  Continue hard efforts at physical therapy until discharged from their care  Continue taking Aspirin twice daily for 4 more weeks  May continue taking Oxycodone and Tylenol as needed for pain relief  Discussed with the patient that she may resume driving once she has discontinued taking Oxycodone, is able to quickly transition from gas to brake pedal in case of emergency, and allow at least 4 weeks for lower extremity proprioception to return to baseline  We will plan to follow-up in 4 weeks for re-evaluation, x-rays not needed at that time  Subjective: 2 weeks status post right total knee arthroplasty DOS 4/11/2023    Patient ID: Vicki Soliz is a 59 y o  female who presents today 2 weeks status post right total knee arthroplasty performed 4/11/2023  Patient is doing well postoperatively  She does note a medial burning and posterior stabbing type of pain as well as periincisional numbness  She notes discomfort with sleeping, ambulating  She notes she has been taking 10 mg of oxycodone, especially prior to physical therapy sessions  She is using a cane for ambulatory assistance at this time    She has initiated outpatient physical therapy where she has good progress in soreness after sessions  She has continued taking aspirin twice daily for DVT prophylaxis  She states she has been compliant in keeping her surgical incision dry while showering  Denies secondary trauma, fever, chills, erythema or drainage from surgical incision  Review of Systems   Constitutional: Negative for chills and fever  HENT: Negative for ear pain and sore throat  Eyes: Negative for pain and visual disturbance  Respiratory: Negative for cough and shortness of breath  Cardiovascular: Negative for chest pain and palpitations  Gastrointestinal: Negative for abdominal pain and vomiting  Genitourinary: Negative for dysuria and hematuria  Musculoskeletal: Positive for arthralgias  Negative for back pain  Skin: Negative for color change and rash  Neurological: Negative for seizures and syncope  All other systems reviewed and are negative  Past Medical History:   Diagnosis Date   • Depression    • Diabetes mellitus ([facility] [address] ) 2022   • Disease of thyroid gland    • Hyperlipidemia    • Hypertension 2022   • Osteoarthritis        Past Surgical History:   Procedure Laterality Date   •  SECTION      x2   • COLONOSCOPY     • MN ARTHRP KNE CONDYLE&PLATU MEDIAL&LAT COMPARTMENTS Right 2023    Procedure: ARTHROPLASTY KNEE TOTAL W ROBOT - RIGHT - PRESS FIT - OVERNIGHT;  Surgeon: Keon Herndon DO;  Location: [address] [place] [address];  Service: Orthopedics       Family History   Problem Relation Age of Onset   • Diabetes Mother    • Cancer Maternal Aunt         lung cancer       Social History     Occupational History   • Not on file   Tobacco Use   • Smoking status: Never   • Smokeless tobacco: Never   Vaping Use   • Vaping Use: Never used   Substance and Sexual Activity   • Alcohol use:  Yes     Alcohol/week: 1 0 standard drink     Types: 1 Glasses of wine per week     Comment: social drinking per month   • Drug use: Never   • Sexual activity: Not Currently     Partners: Male     Birth control/protection: Condom Male         Current Outpatient Medications:   •  acetaminophen (TYLENOL) 325 mg tablet, Take 3 tablets (975 mg total) by mouth every 8 (eight) hours, Disp: , Rfl: 0  •  albuterol (PROVENTIL HFA,VENTOLIN HFA) 90 mcg/act inhaler, INHALE 2 PUFFS BY MOUTH FOUR TIMES A DAY FOR 30 DAYS, Disp: , Rfl:   •  amLODIPine (NORVASC) 5 mg tablet, Take 5 mg by mouth daily, Disp: , Rfl:   •  aspirin 325 mg tablet, Take 1 tablet (325 mg total) by mouth 2 (two) times a day, Disp: 82 tablet, Rfl: 0  •  atorvastatin (LIPITOR) 10 mg tablet, Take 1 tablet (10 mg total) by mouth daily at bedtime (Patient taking differently: Take 20 mg by mouth daily at bedtime), Disp: 90 tablet, Rfl: 3  •  Blood Glucose Monitoring Suppl (ONE TOUCH ULTRA 2) w/Device KIT, USE AS DIRECTED FOR 30 DAYS, Disp: , Rfl:   •  Calcium Carbonate-Vitamin D3 600-400 MG-UNIT TABS, Take 1 tablet by mouth 2 (two) times a day, Disp: , Rfl:   •  celecoxib (CeleBREX) 100 mg capsule, Take 1 capsule (100 mg total) by mouth 2 (two) times a day, Disp: 28 capsule, Rfl: 0  •  D3 High Potency 25 MCG (1000 UT) capsule, Take 1,000 Units by mouth daily, Disp: , Rfl:   •  Diclofenac Sodium (VOLTAREN) 1 %, APPLY 2 GRAMS TO THE AFFECTED AREAS BY TOPICAL ROUTE 4 TIMES PER DAY, Disp: , Rfl:   •  docusate sodium (COLACE) 100 mg capsule, Take 1 capsule (100 mg total) by mouth 2 (two) times a day, Disp: 60 capsule, Rfl: 0  •  estradiol (ESTRACE) 0 1 mg/g vaginal cream, APPLY 2G INTRAVAGINALLY THREE TIMES A WEEK AT BEDTIME FOR 2 WEEKS, THEN TWICE A WEEK AT BEDTIME FOR 2 WEEKS, AND THEN ONE TIME A WEEK THEREA, Disp: , Rfl:   •  famotidine (PEPCID) 20 mg tablet, TAKE 1 TABLET BY MOUTH EVERY EVENING IF NEEDED, Disp: , Rfl:   •  fluticasone (FLONASE) 50 mcg/act nasal spray, SPRAY 2 SPRAYS INTO EACH NOSTRIL AT BEDTIME, Disp: , Rfl:   •  hydrochlorothiazide (HYDRODIURIL) 12 5 mg tablet, Take 12 5 mg by mouth daily, Disp: , Rfl:   •  levothyroxine 88 mcg tablet, TAKE 1 TABLET EVERY DAY BY ORAL ROUTE , Disp: , Rfl:   •  lidocaine (LMX) 4 % cream, APPLY 1 APPLICATION 3 TIMES A DAY BY TOPICAL ROUTE AS NEEDED , Disp: , Rfl:   •  loratadine (CLARITIN) 10 mg tablet, Take 10 mg by mouth daily, Disp: , Rfl:   •  metFORMIN (GLUCOPHAGE) 500 mg tablet, TAKE 1 TABLET TWICE A DAY BY ORAL ROUTE , Disp: , Rfl:   •  omeprazole (PriLOSEC) 40 MG capsule, Take 40 mg by mouth every morning, Disp: , Rfl:   •  OneTouch Ultra test strip, USE AS DIRECTED FOR 90 DAYS TEST DAILY, Disp: , Rfl:   •  oxyCODONE (Roxicodone) 5 immediate release tablet, Take 1-2 tablets by mouth every 4-6 hours as needed for pain, Disp: 60 tablet, Rfl: 0    No Known Allergies    Objective:  Vitals:    04/26/23 0921   BP: 130/74   Pulse: 60       Body mass index is 34 2 kg/m²  Right Knee Exam     Tenderness   The patient is experiencing tenderness in the medial retinaculum  Range of Motion   Extension: 0   Flexion: 110     Tests   Varus: negative Valgus: negative  Drawer:  Anterior - negative        Other   Erythema: absent  Scars: present (healing anterior surgical incision)  Sensation: normal  Pulse: present  Swelling: mild    Comments:    Knee stable at 0, 30, 90 degrees  Skin intact and well perfused  Sensation intact in DP/SP/Marrufo/Sa/T nerve distributions  2+ DP & PT pulses  Brisk capillary refill in all toes              Physical Exam  Vitals and nursing note reviewed  Constitutional:       General: She is not in acute distress  Appearance: Normal appearance  HENT:      Head: Normocephalic and atraumatic  Right Ear: External ear normal       Left Ear: External ear normal       Nose: Nose normal    Eyes:      Extraocular Movements: Extraocular movements intact  Conjunctiva/sclera: Conjunctivae normal    Cardiovascular:      Rate and Rhythm: Normal rate  Pulses: Normal pulses  Pulmonary:      Effort: Pulmonary effort is normal  No respiratory distress     Abdominal:      Palpations: Abdomen is soft    Musculoskeletal:      Cervical back: Normal range of motion and neck supple  Comments: See Ortho Exam   Skin:     General: Skin is warm and dry  Capillary Refill: Capillary refill takes less than 2 seconds  Neurological:      General: No focal deficit present  Mental Status: She is alert and oriented to person, place, and time  Psychiatric:         Mood and Affect: Mood normal          Behavior: Behavior normal          Thought Content: Thought content normal          Judgment: Judgment normal          I have personally reviewed pertinent films in PACS  X-rays of the right knee obtained today reviewed and demonstrate: Orthopedic hardware in excellent position alignment with no signs of loosening or hardware failure      This document was created using speech voice recognition software  Grammatical errors, random word insertions, pronoun errors, and incomplete sentences are an occasional consequence of this system due to software limitations, ambient noise, and hardware issues  Any formal questions or concerns about content, text, or information contained within the body of this dictation should be directly addressed to the provider for clarification

## 2023-04-27 ENCOUNTER — OFFICE VISIT (OUTPATIENT)
Dept: PHYSICAL THERAPY | Facility: CLINIC | Age: 58
End: 2023-04-27

## 2023-04-27 DIAGNOSIS — M17.12 PRIMARY OSTEOARTHRITIS OF LEFT KNEE: ICD-10-CM

## 2023-04-27 DIAGNOSIS — M17.11 PRIMARY OSTEOARTHRITIS OF RIGHT KNEE: Primary | ICD-10-CM

## 2023-04-27 DIAGNOSIS — Z96.651 STATUS POST RIGHT KNEE REPLACEMENT: ICD-10-CM

## 2023-04-27 NOTE — PROGRESS NOTES
Daily Note     Today's date: 2023  Patient name: Richard Amin  : 1965  MRN: [id number]  Referring provider: Anette Oh DO  Dx:   Encounter Diagnosis     ICD-10-CM    1  Primary osteoarthritis of right knee  M17 11       2  Primary osteoarthritis of left knee  M17 12       3  Status post right knee replacement  [id number] [id number]           Start Time: 0800  Stop Time: 0845  Total time in clinic (min): 45 minutes    Subjective: Patient arrives to physical therapy with increased complaints of pain since last session due to frequency of HEP  She had her appointment with Dr Tanesha Landeros yesterday who was very pleased with her progress thus far  Objective: See treatment diary below      Assessment: Patient tolerated today's treatment session well  Cecelia benefits from active R knee flexion in prone than in supine to achieve greater range  She continues to have poor R quad activation with increased pain to isolate quad  eCcelia benefited from [facility] utilizing the bike as she increased her ROM with each revolution  Cecelia remains a good candidate for skilled physical therapy to increase R quad activation and improve ambulatory function to limit gait deviations in order to promote independent mobility  Plan: Continue per plan of care  Progress treatment as tolerated  Progress treament per protocol            Precautions: Type 2 Diabetes, R TKA protocol   HEP ACCESS CODE: [id number]   IE Post-Op: 23  Surgical Date: 23      Date 2023     Visit Number  7 6 5 4 3    Manuals        Knee PROM TG 98, -5 TG 99,-5 TG 94,-4 TG  92, -6 91, -5                           Neuro Re-Ed        Patient Education  TG  TG     Quad sets 3x10 2s hold 3x10 2s hold 3x10 2s hold 3x10 2s hold 3x10 2s hold   Gait training w/ SPC   6x50ft  5x50ft    Step up    4 inch step 2x10 w/ cane    Biodex  weightshifting ML/AP 2 min ea      TKE 2x10 GTB 2x10 GTB              Ther Ex Heel slides 3x10 2s hold 3x10 2s hold 3x10 2s hold  3x10 2s hold  3x10    Seated HS str  3x30s Long sitting  3x30s Long sitting  3x30s 3x30s   Ankle Pumps     2x10    SAQ 2x10  3x10  3x10  2x10  3x10    Log rolling   15x 2x10  3x10    SLR  10x  15x 2x10    Mini Squats 2x10 2x10 2x10      Hip adduction     2x10    Gastroc stretch   3x30s 3x30s     Heel Raises        Seated hip flexion     2x10     AAROM knee On bike half revolutions 2x10 B        Prone Hamstring curls 3x8 w/ SOS str 2x10 w/ SOS str      Ther Activity                        Gait Training                        Modalities                                Insurance:  AMA/CMS Eval/ Re-eval POC expires Diana Ines #/ Referral # Total   Visits  Start date  Expiration date Extension  Visit limitation? PT only or  PT+OT? Co-Insurance   CMS 4/4/23 7/4/23  [id number]  4/4/23 8/4/23  BOMN PT No                                                                 AUTH #: [id number] Date 4/4 4/14 4/17 4/19 4/21 4/24 4/26        Visits  Authed: 12 Used 1 1 1 1 1 1 1         Remaining  12 11 10 9 8 7 6            Patient treated by SIL Melendez under my direct supervision

## 2023-04-28 ENCOUNTER — OFFICE VISIT (OUTPATIENT)
Dept: PHYSICAL THERAPY | Facility: CLINIC | Age: 58
End: 2023-04-28

## 2023-04-28 DIAGNOSIS — M17.12 PRIMARY OSTEOARTHRITIS OF LEFT KNEE: ICD-10-CM

## 2023-04-28 DIAGNOSIS — M17.11 PRIMARY OSTEOARTHRITIS OF RIGHT KNEE: Primary | ICD-10-CM

## 2023-04-28 DIAGNOSIS — Z96.651 STATUS POST RIGHT KNEE REPLACEMENT: ICD-10-CM

## 2023-04-28 NOTE — PROGRESS NOTES
Daily Note     Today's date: 2023  Patient name: Mayelin See  : 1965  MRN: [id number]  Referring provider: Kamala Laguerre DO  Dx:   Encounter Diagnosis     ICD-10-CM    1  Primary osteoarthritis of right knee  M17 11       2  Primary osteoarthritis of left knee  M17 12       3  Status post right knee replacement  Z96 651           Start Time: 0800  Stop Time: 0845  Total time in clinic (min): 45 minutes    Subjective: Patient arrives to physical therapy with complaints of soreness at the R LE since the last session  Simta expressed her frustrations and sadness related to her dependence and pain related to the surgery  Objective: See treatment diary below      Assessment: Patient tolerated today's treatment session well  Cecelia demonstrated improved PROM at the R LE compared to last session seen with goniometric measurements  Patient education on healing time, prognosis, driving restrictions, and continued frequency of HEP was emphasized  Cecelia remains a good candidate for skilled physical therapy to progress R LE AROM and improve stability of the R LE during functional mobility in order to promote independence with ADL's  Plan: Continue per plan of care  Progress treatment as tolerated  Progress treament per protocol            Precautions: Type 2 Diabetes, R TKA protocol   HEP ACCESS CODE: [id number]   IE Post-Op: 23  Surgical Date: 23      Date 2023     Visit Number  8 7 6 5 4   Manuals        Knee PROM TG  100,-3 TG 98, -5 TG 99,-5 TG 94,-4 TG  92, -6                           Neuro Re-Ed        Patient Education  TG TG  TG    Quad sets 3x10 2s hold 3x10 2s hold 3x10 2s hold 3x10 2s hold 3x10 2s hold   Gait training w/ SPC    6x50ft    Step up Lateral  4inch 10x B     4 inch step 2x10 w/ cane   Biodex weightshifting ML/AP 2 min ea    Squats 2x10   weightshifting ML/AP 2 min ea     TKE  2x10 GTB 2x10 GTB             Ther Ex Heel slides 3x10 2s hold 3x10 2s hold 3x10 2s hold 3x10 2s hold  3x10 2s hold    Seated HS str   3x30s Long sitting  3x30s Long sitting  3x30s   Ankle Pumps        SAQ  2x10  3x10  3x10  2x10    Log rolling    15x 2x10    SLR   10x  15x   Mini Squats  2x10 2x10 2x10     Hip adduction        Gastroc stretch    3x30s 3x30s    Heel Raises 2x10       Seated hip flexion      2x10    Prone hang  2 5# 3 min        AAROM knee  On bike half revolutions 2x10 B       Prone Hamstring curls 3x8 w/ SOS str w/ 2 5#  3x8 w/ SOS str 2x10 w/ SOS str     Ther Activity                        Gait Training                        Modalities                                Insurance:  AMA/CMS Eval/ Re-eval POC expires Reford Luca #/ Referral # Total   Visits  Start date  Expiration date Extension  Visit limitation? PT only or  PT+OT? Co-Insurance   CMS 4/4/23 7/4/23  [id number]  4/4/23 8/4/23  BOMN PT No                                                                 AUTH #: [id number] Date 4/4 4/14 4/17 4/19 4/21 4/24 4/26 4/28       Visits  Authed: 12 Used 1 1 1 1 1 1 1 1        Remaining  12 11 10 9 8 7 6 5           Patient treated by Nahomy Lawrence, SIL under my direct supervision

## 2023-05-02 ENCOUNTER — OFFICE VISIT (OUTPATIENT)
Dept: PHYSICAL THERAPY | Facility: CLINIC | Age: 58
End: 2023-05-02

## 2023-05-02 DIAGNOSIS — M17.12 PRIMARY OSTEOARTHRITIS OF LEFT KNEE: ICD-10-CM

## 2023-05-02 DIAGNOSIS — Z96.651 STATUS POST RIGHT KNEE REPLACEMENT: ICD-10-CM

## 2023-05-02 DIAGNOSIS — M17.11 PRIMARY OSTEOARTHRITIS OF RIGHT KNEE: Primary | ICD-10-CM

## 2023-05-02 NOTE — PROGRESS NOTES
Daily Note     Today's date: 2023  Patient name: Renetta Mccurdy  : 1965  MRN: [id number]  Referring provider: Randee Friedman DO  Dx:   Encounter Diagnosis     ICD-10-CM    1  Primary osteoarthritis of right knee  M17 11       2  Primary osteoarthritis of left knee  M17 12       3  Status post right knee replacement  Z96 651           Start Time: 0800  Stop Time: 0845  Total time in clinic (min): 45 minutes    Subjective: Pt reports that she had a difficult time sleeping due to the burning and aching pain around her knee  Pt continues taking pain killers to limit her discomfort, states straightening her knee throughout the day is hard  No new complaints  Objective: See treatment diary below      Assessment: Tolerated treatment well  Patient demonstrated fatigue post treatment, exhibited good technique with therapeutic exercises and would benefit from continued PT  Pt continues with empty end feels during PROM stretching and mobilizations by treating therapist  Pt re-educated on proper gait pattern and tested without AD, pt demonstrated appropriate stability however slight trendelenburg  Plan: Continue per plan of care  Progress treatment as tolerated             Precautions: Type 2 Diabetes, R TKA protocol   HEP ACCESS CODE: [id number]   IE Post-Op: 23  Surgical Date: 23      Date 2023     Visit Number  9 8 7 6 5   Manuals        Knee PROM , -2 TG  100,-3 TG 98, -5 TG 99,-5 TG 94,-4   Knee mobilization Ext & flex                       Neuro Re-Ed        Patient Education   TG TG  TG   Quad sets 2x10 3x10 2s hold 3x10 2s hold 3x10 2s hold 3x10 2s hold   Gait training w/ SPC     6x50ft   Step up  Lateral  4inch 10x B       Biodex  weightshifting ML/AP 2 min ea    Squats 2x10   weightshifting ML/AP 2 min ea    Squats At bar w/VC 2x10       TKE   2x10 GTB 2x10 GTB    LAQ W/VC for quad activation 2x10       SLR 2x10 w/quad set       Ther Ex Heel slides 3x10 2s hold 3x10 2s hold 3x10 2s hold 3x10 2s hold 3x10 2s hold    Seated HS str    3x30s Long sitting  3x30s Long sitting    Ankle Pumps        SAQ   2x10  3x10  3x10    Log rolling     15x   SLR    10x    Mini Squats   2x10 2x10 2x10    Hip adduction        Gastroc stretch     3x30s   Heel Raises  2x10      Seated hip flexion         Prone hang   2 5# 3 min       AAROM knee   On bike half revolutions 2x10 B      Clinic ambulation W/o AD 4 laps 50ft       Prone Hamstring curls 2x10 w/2lbs 3x8 w/ SOS str w/ 2 5#  3x8 w/ SOS str 2x10 w/ SOS str    Ther Activity                        Gait Training                        Modalities                                Insurance:  AMA/CMS Eval/ Re-eval POC expires Alison Douglas #/ Referral # Total   Visits  Start date  Expiration date Extension  Visit limitation? PT only or  PT+OT? Co-Insurance   CMS 4/4/23 7/4/23  [id number]  4/4/23 8/4/23  BOMN PT No                                                                 AUTH #: [id number] Date 4/4 4/14 4/17 4/19 4/21 4/24 4/26 4/28       Visits  Authed: 12 Used 1 1 1 1 1 1 1 1        Remaining  12 11 10 9 8 7 6 5           Patient treated by SIL Stein under my direct supervision

## 2023-05-03 ENCOUNTER — OFFICE VISIT (OUTPATIENT)
Dept: PHYSICAL THERAPY | Facility: CLINIC | Age: 58
End: 2023-05-03

## 2023-05-03 DIAGNOSIS — Z96.651 STATUS POST RIGHT KNEE REPLACEMENT: ICD-10-CM

## 2023-05-03 DIAGNOSIS — M17.12 PRIMARY OSTEOARTHRITIS OF LEFT KNEE: ICD-10-CM

## 2023-05-03 DIAGNOSIS — M17.11 PRIMARY OSTEOARTHRITIS OF RIGHT KNEE: Primary | ICD-10-CM

## 2023-05-03 NOTE — PROGRESS NOTES
Daily Note     Today's date: 5/3/2023  Patient name: Fredrick Camilo  : 1965  MRN: [id number]  Referring provider: Sandy Cruz DO  Dx:   Encounter Diagnosis     ICD-10-CM    1  Primary osteoarthritis of right knee  M17 11       2  Primary osteoarthritis of left knee  M17 12       3  Status post right knee replacement  Z96 651           Start Time: 0800  Stop Time: 0845  Total time in clinic (min): 45 minutes    Subjective: Pt reports that her knee felt good after her treatment session yesterday, however after standing to cook for awhile her knee got very swollen and stiff  Pt feels tight prior to the start of her treatment session  Objective: See treatment diary below      Assessment: Tolerated treatment well  Patient demonstrated fatigue post treatment, exhibited good technique with therapeutic exercises and would benefit from continued PT      Plan: Continue per plan of care  Progress treatment as tolerated             Precautions: Type 2 Diabetes, R TKA protocol   HEP ACCESS CODE: [id number]   IE Post-Op: 23  Surgical Date: 23      Date 5/3 5/2 2023   2023   2023     Visit Number  10 9 8 7 6   Manuals        Knee PROM  , -2 TG  100,-3 TG 98, -5 TG 99,-5   Knee mobilization  Ext & flex                      Neuro Re-Ed        Patient Education    TG TG    Quad sets  2x10 3x10 2s hold 3x10 2s hold 3x10 2s hold   Gait training w/ SPC        Step up Up and down fwd 2x10 4in  Lateral  4inch 10x B      Biodex   weightshifting ML/AP 2 min ea    Squats 2x10   weightshifting ML/AP 2 min ea   Squats At bar w/VC 2x10 At bar w/VC 2x10      TKE    2x10 GTB 2x10 GTB   LAQ  W/VC for quad activation 2x10      SLR 2x10 w/quad set 2x10 w/quad set      Ther Ex        Heel slides 3x10 2s hold 3x10 2s hold 3x10 2s hold 3x10 2s hold 3x10 2s hold   Seated HS str     3x30s Long sitting    Recumbent bike 5 mis not full revolutions while reviewing subj       Ankle Pumps        SAQ    2x10  3x10 Log rolling        SLR     10x   Mini Squats    2x10 2x10   Hip adduction        Gastroc stretch        Heel Raises   2x10     Seated hip flexion         Prone hang  5lbs 3 mins  2 5# 3 min      AAROM knee    On bike half revolutions 2x10 B     Clinic ambulation W/o AD 3 laps 50ft W/o AD 4 laps 50ft      Prone Hamstring curls  2x10 w/2lbs 3x8 w/ SOS str w/ 2 5#  3x8 w/ SOS str 2x10 w/ SOS str   Ther Activity                        Gait Training                        Modalities                                Insurance:  AMA/CMS Eval/ Re-eval POC expires Carmen Angry #/ Referral # Total   Visits  Start date  Expiration date Extension  Visit limitation? PT only or  PT+OT? Co-Insurance   CMS 4/4/23 7/4/23  [id number]  4/4/23 8/4/23  BOMN PT No                                                                 AUTH #: [id number] Date 4/4 4/14 4/17 4/19 4/21 4/24 4/26 4/28       Visits  Authed: 12 Used 1 1 1 1 1 1 1 1        Remaining  12 11 10 9 8 7 6 5           Patient treated by Dianelys Tony, SPT under my direct supervision

## 2023-05-05 ENCOUNTER — OFFICE VISIT (OUTPATIENT)
Dept: PHYSICAL THERAPY | Facility: CLINIC | Age: 58
End: 2023-05-05

## 2023-05-05 DIAGNOSIS — M17.11 PRIMARY OSTEOARTHRITIS OF RIGHT KNEE: Primary | ICD-10-CM

## 2023-05-05 DIAGNOSIS — M17.12 PRIMARY OSTEOARTHRITIS OF LEFT KNEE: ICD-10-CM

## 2023-05-05 DIAGNOSIS — Z96.651 STATUS POST RIGHT KNEE REPLACEMENT: ICD-10-CM

## 2023-05-05 NOTE — PROGRESS NOTES
Daily Note     Today's date: 2023  Patient name: Taylor Alvarez  : 1965  MRN: [id number]  Referring provider: Oralia Turcios DO  Dx:   Encounter Diagnosis     ICD-10-CM    1  Primary osteoarthritis of right knee  M17 11       2  Primary osteoarthritis of left knee  M17 12       3  Status post right knee replacement  Z96 651           Start Time: 0800  Stop Time: 0845  Total time in clinic (min): 45 minutes    Subjective: Pt reports that      Objective: See treatment diary below      Assessment: Tolerated treatment well  Patient demonstrated fatigue post treatment, exhibited good technique with therapeutic exercises and would benefit from continued PT  Pt continues to be restricted with her motion but is improving every week  Pt continues to lack appropriate knee extension and quad activation, however is improving  Pt is progressing well post-op, however deficits persist        Plan: Continue per plan of care  Progress treatment as tolerated             Precautions: Type 2 Diabetes, R TKA protocol   HEP ACCESS CODE: [id number]   IE Post-Op: 23  Surgical Date: 23      Date 5/5 5/3 5/2 2023   2023     Visit Number  11 10 9 8 7   Manuals        Knee PROM   , -2 TG  100,-3 TG 98, -5   Knee mobilization   Ext & flex                     Neuro Re-Ed        Patient Education     TG TG   Quad sets   2x10 3x10 2s hold 3x10 2s hold   Gait training w/ SPC        STS        bridges 15x  15x w/blue TB abd       Step up  Up and down fwd 2x10 4in  Lateral  4inch 10x B     Biodex Squats 3x10  LOS static 2x  RC 2 mins static   weightshifting ML/AP 2 min ea    Squats 2x10     Squats  At bar w/VC 2x10 At bar w/VC 2x10     TKE     2x10 GTB   LAQ   W/VC for quad activation 2x10     SLR 2x10 2x10 w/quad set 2x10 w/quad set     Ther Ex        Heel slides  3x10 2s hold 3x10 2s hold 3x10 2s hold 3x10 2s hold   Seated HS str        Recumbent bike 5 mins not full revolutions while review sujb 5 mis not full revolutions while reviewing subj      Ankle Pumps        SAQ     2x10    Log rolling        SLR        Mini Squats     2x10   Hip adduction        Gastroc stretch        Heel Raises    2x10    Seated hip flexion         Prone hang  5lbs 3 mins 5lbs 3 mins  2 5# 3 min     AAROM knee     On bike half revolutions 2x10 B    Clinic ambulation W/o AD 3 laps 50ft W/o AD 3 laps 50ft W/o AD 4 laps 50ft     Prone Hamstring curls Standing HS curls no weight 2x10  2x10 w/2lbs 3x8 w/ SOS str w/ 2 5#  3x8 w/ SOS str   Ther Activity                        Gait Training                        Modalities                                Insurance:  AMA/CMS Eval/ Re-eval POC expires Vianne Mick #/ Referral # Total   Visits  Start date  Expiration date Extension  Visit limitation? PT only or  PT+OT?  Co-Insurance   CMS 4/4/23 7/4/23  [id number]  4/4/23 8/4/23  BOMN PT No                                                                 AUTH #: [id number] Date 4/4 4/14 4/17 4/19 4/21 4/24 4/26 4/27 4/28 5/2 5/3 5/5   Visits  Authed: 12 Used 1 1 1 1 1 1 1 1 1 1 1 1    Remaining  12 11 10 9 8 7 6 5 4 3 2 1

## 2023-05-08 DIAGNOSIS — Z96.651 S/P TOTAL KNEE REPLACEMENT NOT USING CEMENT, RIGHT: ICD-10-CM

## 2023-05-09 ENCOUNTER — OFFICE VISIT (OUTPATIENT)
Dept: PHYSICAL THERAPY | Facility: CLINIC | Age: 58
End: 2023-05-09

## 2023-05-09 DIAGNOSIS — M17.11 PRIMARY OSTEOARTHRITIS OF RIGHT KNEE: Primary | ICD-10-CM

## 2023-05-09 DIAGNOSIS — M17.12 PRIMARY OSTEOARTHRITIS OF LEFT KNEE: ICD-10-CM

## 2023-05-09 DIAGNOSIS — Z96.651 STATUS POST RIGHT KNEE REPLACEMENT: ICD-10-CM

## 2023-05-09 RX ORDER — DOCUSATE SODIUM 100 MG
CAPSULE ORAL
Qty: 60 CAPSULE | Refills: 0 | Status: SHIPPED | OUTPATIENT
Start: 2023-05-09

## 2023-05-09 NOTE — PROGRESS NOTES
Daily Note     Today's date: 2023  Patient name: Ary Dale  : 1965  MRN: [id number]  Referring provider: Vlad Garcia DO  Dx:   Encounter Diagnosis     ICD-10-CM    1  Primary osteoarthritis of right knee  M17 11       2  Primary osteoarthritis of left knee  M17 12       3  Status post right knee replacement  Z96 651           Start Time: 0800  Stop Time: 0845  Total time in clinic (min): 45 minutes    Subjective: Pt reports that she drove for the first time over the weekend and states it felt good, she drove to PT today and states having a sharp pain in the back of her knee  Objective: See treatment diary below      Assessment: Tolerated treatment well  Patient demonstrated fatigue post treatment, exhibited good technique with therapeutic exercises and would benefit from continued PT      Plan: Continue per plan of care  Progress treatment as tolerated             Precautions: Type 2 Diabetes, R TKA protocol   HEP ACCESS CODE: [id number]   IE Post-Op: 23  Surgical Date: 23      Date 5/9 5/5 5/3 5/2 2023     Visit Number  12 11 10 9 8   Manuals        Knee PROM    , -2 TG  100,-3   Knee mobilization    Ext & flex                    Neuro Re-Ed        Patient Education      TG   Quad sets    2x10 3x10 2s hold   Gait training w/ SPC        STS        bridges 2x10 w/blue TB abd 15x  15x w/blue TB abd      Step up   Up and down fwd 2x10 4in  Lateral  4inch 10x B    Biodex  Squats 3x10  LOS static 2x  RC 2 mins static   weightshifting ML/AP 2 min ea    Squats 2x10    Squats   At bar w/VC 2x10 At bar w/VC 2x10    TKE        LAQ    W/VC for quad activation 2x10    SLR 2x10 w/quad set 2x10 2x10 w/quad set 2x10 w/quad set    Ther Ex        Heel slides Yellow pball 2x10  3x10 2s hold 3x10 2s hold 3x10 2s hold   Seated HS str W/SOS calf str 4x30s       Recumbent bike 5 mins while review subj 5 mins not full revolutions while review sujb 5 mis not full revolutions while reviewing subj

## 2023-05-11 ENCOUNTER — OFFICE VISIT (OUTPATIENT)
Dept: PHYSICAL THERAPY | Facility: CLINIC | Age: 58
End: 2023-05-11

## 2023-05-11 DIAGNOSIS — M17.11 PRIMARY OSTEOARTHRITIS OF RIGHT KNEE: Primary | ICD-10-CM

## 2023-05-11 DIAGNOSIS — Z96.651 STATUS POST RIGHT KNEE REPLACEMENT: ICD-10-CM

## 2023-05-11 DIAGNOSIS — M17.12 PRIMARY OSTEOARTHRITIS OF LEFT KNEE: ICD-10-CM

## 2023-05-11 NOTE — PROGRESS NOTES
Daily Note     Today's date: 2023  Patient name: Yasmin Donovan  : 1965  MRN: [id number]  Referring provider: Daisy Sheldon DO  Dx:   Encounter Diagnosis     ICD-10-CM    1  Primary osteoarthritis of right knee  M17 11       2  Primary osteoarthritis of left knee  M17 12       3  Status post right knee replacement  Z96 651           Start Time: 0800  Stop Time: 0845  Total time in clinic (min): 45 minutes    Subjective: Pt states she drove by herself today for the first time since the surgery  She was also able to sleep throughout the night without waking up from her knee pain, which is the first time  Pt states stiffness prior to the start of her treatment session  Objective: See treatment diary below      Assessment: Tolerated treatment well  Patient demonstrated fatigue post treatment, exhibited good technique with therapeutic exercises and would benefit from continued PT      Plan: Continue per plan of care  Progress treatment as tolerated             Precautions: Type 2 Diabetes, R TKA protocol   HEP ACCESS CODE: [id number]   IE Post-Op: 23  Surgical Date: 23      Date 5/11 5/9 5/5 5/3 5/2   Visit Number  13 12 11 10 9   Manuals        Knee PROM , -4    , -2   Knee mobilization     Ext & flex                   Neuro Re-Ed        Patient Education         Quad sets     2x10   Gait training w/ SPC        STS        bridges  2x10 w/blue TB abd 15x  15x w/blue TB abd     Step up    Up and down fwd 2x10 4in    Biodex   Squats 3x10  LOS static 2x  RC 2 mins static     Squats    At bar w/VC 2x10 At bar w/VC 2x10   TKE        SLS 5x10s flat       SAQ 2x10       LAQ     W/VC for quad activation 2x10   SLR 2x10 w/quad set 2x10 w/quad set 2x10 2x10 w/quad set 2x10 w/quad set   Ther Ex        Heel slides Yellow pball 2x10 Yellow pball 2x10  3x10 2s hold 3x10 2s hold   Seated HS str  W/SOS calf str 4x30s      Recumbent bike 5 mins while review subj 5 mins while review subj 5 mins not full revolutions while review sujb 5 mis not full revolutions while reviewing subj    Ankle Pumps        SAQ        Log rolling        SLR        Mini Squats        Hip adduction        Gastroc stretch        Heel Raises        Seated hip flexion         Step up & down  4in 15x ea  6in up      Prone hang  2 mins 5lbs  5lbs 3 mins 5lbs 3 mins    AAROM knee        Stool HS pulls 2 laps 30ft       Clinic ambulation W/o AD 3 laps 50ft W/o AD 3 laps 50ft W/o AD 3 laps 50ft W/o AD 3 laps 50ft W/o AD 4 laps 50ft   Prone Hamstring curls   Standing HS curls no weight 2x10  2x10 w/2lbs   Ther Activity                        Gait Training                        Modalities                                Insurance:  AMA/CMS Eval/ Re-eval POC expires Avtar Sizer #/ Referral # Total   Visits  Start date  Expiration date Extension  Visit limitation? PT only or  PT+OT?  Co-Insurance   CMS 4/4/23 7/4/23  [id number] 4/4/23 8/4/23  BOMN PT No                                                                 AUTH #: [id number] Date 4/4 4/14 4/17 4/19 4/21 4/24 4/26 4/27 4/28 5/2 5/3 5/5 5/8   Visits  Authed: 12 Used 1 1 1 1 1 1 1 1 1 1 1 1 1    Remaining  12 11 10 9 8 7 6 5 4 3 2 1 0

## 2023-05-12 ENCOUNTER — OFFICE VISIT (OUTPATIENT)
Dept: PHYSICAL THERAPY | Facility: CLINIC | Age: 58
End: 2023-05-12

## 2023-05-12 DIAGNOSIS — M17.12 PRIMARY OSTEOARTHRITIS OF LEFT KNEE: ICD-10-CM

## 2023-05-12 DIAGNOSIS — M17.11 PRIMARY OSTEOARTHRITIS OF RIGHT KNEE: Primary | ICD-10-CM

## 2023-05-12 DIAGNOSIS — Z96.651 STATUS POST RIGHT KNEE REPLACEMENT: ICD-10-CM

## 2023-05-12 NOTE — PROGRESS NOTES
PT Re-Evaluation     Today's date: 2023  Patient name: Kiah Alas  : 1965  MRN: [id number]  Referring provider: Ya Kerns DO  Dx:   Encounter Diagnosis     ICD-10-CM    1  Primary osteoarthritis of right knee  M17 11       2  Primary osteoarthritis of left knee  M17 12       3  Status post right knee replacement  Z96 651                      Assessment  Assessment details: Patient, Kiah Alas, arrives to outpatient physical therapy with primary complaints of acute knee pain following a R TKA on 23  Cecelia demonstrates objective improvements in R knee flexion and extension A/PROM testing, along with improved strength globally in the LE  Additionally, she demonstrates improvements with functional test scores, demonstrating improved activity tolerance and ambulation tolerance around the home  She now ambulates without assistive device, though with a slow gait speed and moderate dynamic balance impairment  She continues to have limitations with ascending/descneding stairs with step to pattern, ambulating long distances, and sit to stand transitions  These limitations impact Cecelia to participate in self-care, ADL's, and recreational activities  Cecelia would likely benefit from continued skilled physical therapy to address their aforementioned impairments and limitations in order to return to prior level of functioning and maximize independent mobility  Impairments: abnormal coordination, abnormal gait, abnormal or restricted ROM, abnormal movement, activity intolerance, impaired balance, impaired physical strength, pain with function, safety issue, weight-bearing intolerance and poor body mechanics    Symptom irritability: lowUnderstanding of Dx/Px/POC: good   Prognosis: good   Goals  STG (2-6 weeks)  1  Patient will be independent with comprehensive HEP  -met  2  Patient will demonstrate an increase in active knee ROM to WNL in order to improve gait  -met  3   Patient will improve knee MMT by a score of 1 in order to perform ADL's -met    LTG (6-12 weeks)  1  Patient will be able to work a full shift with less than 3 out of 10 pain  -progressing toward  2  Patient will ascend and descend full flight of stairs reciprocally and without increase in pain in order to complete ADL's  -progressing toward  3  Patient will demonstrate full functional squat with proper knee mechanics with no reports of pain  -progressing toward  4  Patient will return to their previous exercise level using corrected mechanics without increase in pain  -progressing toward     Plan  Patient would benefit from: skilled physical therapy and PT eval  Planned modality interventions: cryotherapy, TENS and thermotherapy: hydrocollator packs  Planned therapy interventions: ADL retraining, abdominal trunk stabilization, activity modification, IADL retraining, joint mobilization, manual therapy, massage, motor coordination training, muscle pump exercises, neuromuscular re-education, patient education, ADL training, balance, balance/weight bearing training, body mechanics training, postural training, self care, coordination, strengthening, stretching, therapeutic activities, therapeutic exercise, flexibility, fine motor coordination training, gait training, graded activity, functional ROM exercises, graded exercise, graded motor, transfer training, therapeutic training and home exercise program  Frequency: 2-3x/week  Duration in weeks: 12  Treatment plan discussed with: patient         Subjective Evaluation     History of Present Illness  Date of surgery: 4/11/2023  Mechanism of injury: surgery  Mechanism of injury: Post Op 4/14/23: Patient, Ashley Brock, arrives to outpatient physical therapy with primary complaints of R knee pain following R TKA surgery on 4/11/23  She reports she has been taking over the counter medications only and has not taken prescription pain killers over the past few days    She reports her walking is improving, though she continues to be nervous about falling  She continues to ascend/descend the steps slowly, one step at a time  She reports her pain has decreased significantly, though she continues to have consistent pain              Recurrent probem    Quality of life: good    Pain  Current pain ratin  At best pain ratin  At worst pain ratin  Quality: burning, sharp, tight, discomfort and squeezing  Relieving factors: rest, relaxation and ice  Aggravating factors: standing, walking, stair climbing, running and sitting  Progression: worsening     Social Support  Steps to enter house: yes  2  17  Lives in: multiple-level home  Lives with: alone     Employment status: not working  Exercise history: Likes to go on [employer]       Diagnostic Tests  X-ray: abnormal  Treatments  Previous treatment: physical therapy and injection treatment  Patient Goals  Patient goals for therapy: decreased pain, increased strength, independence with ADLs/IADLs, return to sport/leisure activities, increased motion and improved balance              Objective      Tenderness   Left Knee   No tenderness in the lateral joint line, LCL (distal), LCL (proximal), MCL (distal), MCL (proximal) and medial joint line       Right Knee   Tenderness in the lateral joint line, LCL (distal), LCL (proximal), MCL (distal), MCL (proximal), medial joint line and patellar tendon       Active Range of Motion   Left Knee   Flexion: 132 degrees   Extension: 0 degrees      Right Knee   Flexion: 97 degrees with pain  Extension: -2 degrees with pain     Passive Range of Motion   Left Knee   Flexion: 138 degrees      Right Knee   Flexion: 105 degrees with pain  Extension: -2 degrees      Mobility   Patellar Mobility:   Left Knee   WFL: medial and lateral    Hypomobile: left superior and left inferior     Patellar Static Positioning   Left Knee: goldie  Right Knee: ogldie     Strength/Myotome Testing      Left Hip   Planes of Motion   Flexion: 4+  Abduction: "4+  Adduction: 4+     Right Hip   Planes of Motion   Flexion: 2+  Abduction: 4+  Adduction: 4+     Left Knee   Flexion: 4+  Extension: 4+  Quadriceps contraction: fair     Right Knee   Flexion: 4   Extension: 3+ (pain)  Quadriceps contraction: poor     Ambulation   Weight-Bearing Status   Weight-Bearing Status (Left): full weight bearing   Weight-Bearing Status (Right): full weight-bearing    Distance in feet: 100  Assistive device used: none     Ambulation: Level Surfaces   Ambulation with assistive device: independent  Ambulation without assistive device: independent     Observational Gait   Stride length within functional limits  Increased left stance time and left swing time  Decreased walking speed, right stance time and right swing time     Left foot contact pattern: foot flat  Right foot contact pattern: foot flat  Left arm swing: within functional limits  Right arm swing: within functional limits  Base of support: normal  Left stance time comments: minimal  Left swing time comments: minimal  Right stance time comments: minimal  Right swing time comments: minimal     Functional Assessment        Squat    Unable to perform        Forward Step Up 6\"   Left Leg  Increased forward trunk lean       Right Leg  With pain and contralateral trunk lean       Forward Step Up 8\"   Left Leg  Increased forward trunk lean       Forward Step Down 6\"   Left Leg  Unable to perform       Right Leg  Pain and contralateral trunk side bending       Forward Step Down 8\"   Left Leg  Unable to perform       Right Leg  Pain and contralateral trunk side bending       Comments  Post Op 23:  5 STS: 18s   TUs     SLS:  L: 25s  R: 10s        Precautions:     Precautions: Type 2 Diabetes, R TKA protocol   HEP ACCESS CODE: [id number]   IE Post-Op: 23  Surgical Date: 23      Date 5/12 5/11 5/9 5/5 5/3   Visit Number  14 13 12 11 10   Manuals        Knee PROM , -4 , -4      Knee mobilization                      " Neuro Re-Ed        Patient Education         Quad sets        Gait training w/ SPC        STS        bridges   2x10 w/blue TB abd 15x  15x w/blue TB abd    Step up     Up and down fwd 2x10 4in   Biodex    Squats 3x10  LOS static 2x  RC 2 mins static    Squats     At bar w/VC 2x10   TKE        SLS 5x10s flat 5x10s flat      SAQ 2x10 2x10      LAQ        SLR 2x10 w/ quad set 2x10 w/quad set 2x10 w/quad set 2x10 2x10 w/quad set   Ther Ex        Heel slides Yellow pball 2x10 Yellow pball 2x10 Yellow pball 2x10  3x10 2s hold   Seated HS str   W/SOS calf str 4x30s     Recumbent bike 5 mins while subj review 5 mins while review subj 5 mins while review subj 5 mins not full revolutions while review sujb 5 mis not full revolutions while reviewing subj   Ankle Pumps        SAQ        Log rolling        SLR        Mini Squats        Hip adduction        Gastroc stretch        Heel Raises        Seated hip flexion         Step up & down 6in up 2x10  4in 15x ea  6in up     Prone hang   2 mins 5lbs  5lbs 3 mins 5lbs 3 mins   AAROM knee        Stool HS pulls 2 laps 30 ft 2 laps 30ft      Clinic ambulation W/o AD 5 laps 40 feet W/o AD 3 laps 50ft W/o AD 3 laps 50ft W/o AD 3 laps 50ft W/o AD 3 laps 50ft   Prone Hamstring curls    Standing HS curls no weight 2x10    Ther Activity                        Gait Training                        Modalities

## 2023-05-17 ENCOUNTER — OFFICE VISIT (OUTPATIENT)
Dept: PHYSICAL THERAPY | Facility: CLINIC | Age: 58
End: 2023-05-17

## 2023-05-17 DIAGNOSIS — M17.12 PRIMARY OSTEOARTHRITIS OF LEFT KNEE: ICD-10-CM

## 2023-05-17 DIAGNOSIS — Z96.651 STATUS POST RIGHT KNEE REPLACEMENT: ICD-10-CM

## 2023-05-17 DIAGNOSIS — M17.11 PRIMARY OSTEOARTHRITIS OF RIGHT KNEE: Primary | ICD-10-CM

## 2023-05-17 NOTE — PROGRESS NOTES
"Daily Note     Today's date: 2023  Patient name: Lashanda Wyman  : 1965  MRN: [id number]  Referring provider: David See DO  Dx:   Encounter Diagnosis     ICD-10-CM    1  Primary osteoarthritis of right knee  M17 11       2  Status post right knee replacement  Z96 651       3  Primary osteoarthritis of left knee  M17 12                      Subjective: Patient reports she walked up 3 steps at home and felt soreness, she took tylenol for it  Objective: See treatment diary below      Assessment: Tolerated treatment well  Patient continues to demonstrate mild extension lag with straight leg raise, terminal knee extension impaired both open chain and closed chain  Improved knee mechanics noted with step ups, decreased valgus and improved hip extension and knee extension strength  Patient demonstrated fatigue post treatment, exhibited good technique with therapeutic exercises and would benefit from continued PT      Plan: Continue per plan of care  Progress treatment as tolerated  Progress challenge as appropriate with irritability       Precautions:     Precautions: Type 2 Diabetes, R TKA protocol   HEP ACCESS CODE: [id number]   IE Post-Op: 23  Surgical Date: 23      Date    Visit Number  15 14 13 12 11   Manuals        Knee PROM  , -4 , -4     Knee mobilization                        Neuro Re-Ed        Patient Education         Quad sets        Gait training w/ SPC        STS        bridges    2x10 w/blue TB abd 15x  15x w/blue TB abd   Step up        Biodex     Squats 3x10  LOS static 2x  RC 2 mins static   Squats        TKE        SLS 10x10\" flat 5x10s flat 5x10s flat     SAQ 2x10 2x10 2x10     LAQ 2x10 w/ w/ flexion       SLR 2x10 w/ quad set 2x10 w/ quad set 2x10 w/quad set 2x10 w/quad set 2x10   Ther Ex        Heel slides Green pball 2x10 Yellow pball 2x10 Yellow pball 2x10 Yellow pball 2x10    Seated HS str    W/SOS calf str 4x30s    Recumbent " "bike 5 mins with subj review 5 mins while subj review 5 mins while review subj 5 mins while review subj 5 mins not full revolutions while review sujb   Ankle Pumps        SAQ        Log rolling        SLR        Mini Squats        Hip adduction        Gastroc stretch        Heel Raises        Seated hip flexion         Step up & down 6\" up 2x10, lateral 2x10 6\", 6\" step down 2x10 6in up 2x10  4in 15x ea  6in up    Prone hang    2 mins 5lbs  5lbs 3 mins   STS 2x10       Stool HS pulls  2 laps 30 ft 2 laps 30ft     Clinic ambulation  W/o AD 5 laps 40 feet W/o AD 3 laps 50ft W/o AD 3 laps 50ft W/o AD 3 laps 50ft   Prone Hamstring curls     Standing HS curls no weight 2x10   Ther Activity                        Gait Training                        Modalities                                           "

## 2023-05-19 ENCOUNTER — OFFICE VISIT (OUTPATIENT)
Dept: PHYSICAL THERAPY | Facility: CLINIC | Age: 58
End: 2023-05-19

## 2023-05-19 DIAGNOSIS — M17.12 PRIMARY OSTEOARTHRITIS OF LEFT KNEE: ICD-10-CM

## 2023-05-19 DIAGNOSIS — Z96.651 STATUS POST RIGHT KNEE REPLACEMENT: ICD-10-CM

## 2023-05-19 DIAGNOSIS — M17.11 PRIMARY OSTEOARTHRITIS OF RIGHT KNEE: Primary | ICD-10-CM

## 2023-05-19 NOTE — PROGRESS NOTES
"Daily Note      Today's date: 2023  Patient name: Alyssa Talley  : 1965  MRN: [id number]  Referring provider: Susan Roach DO  Dx:   Encounter Diagnosis     ICD-10-CM    1  Primary osteoarthritis of right knee  M17 11       2  Status post right knee replacement  Z96 651       3  Primary osteoarthritis of left knee  M17 12           Subjective: Pt reports that her right knee feels \"a little stiff  \"     Objective: See treatment diary below    Assessment: Pt tolerated treatment well  Pt continues to present with 3-4° limitation in terminal knee extension  Pt was educated in strategies to increase knee extension, including heel prop for 3 minutes, 1-2x/daily  Pt verbalized understanding  Pt was also instructed in various methods of knee flexion ROM, including seated AAROM with contralateral heel overpressure and seated forward scooting  Pt was educated on the importance of achieving full knee extension for gait and balance  Plan: Continue per plan of care  Progress treatment as tolerated            Precautions:     Precautions: Type 2 Diabetes, R TKA protocol   HEP ACCESS CODE: [id number]   IE Post-Op: 23  Surgical Date: 23      Date    Visit Number  16 15 14 13 12   Manuals        Knee PROM DJA 4-108°  , -4 , -4    Knee mobilization                        Neuro Re-Ed        Patient Education  5 min HEP for flex/ext AAROM       Quad sets        Gait training w/ SPC        STS        bridges     2x10 w/blue TB abd   Step up        Biodex        Squats        TKE        SLS  10x10\" flat 5x10s flat 5x10s flat    SAQ  2x10 2x10 2x10    LAQ 2x10  2x10 w/ w/ flexion      SLR 2x10 w/ quad set  2x10 w/ quad set 2x10 w/ quad set 2x10 w/quad set 2x10 w/quad set   Ther Ex        Heel slides Green pball 2x10 Green pball 2x10 Yellow pball 2x10 Yellow pball 2x10 Yellow pball 2x10   Seated HS str     W/SOS calf str 4x30s   Recumbent bike 5 min  5 mins with subj review 5 " "mins while subj review 5 mins while review subj 5 mins while review subj   Ankle Pumps        SAQ        Log rolling        SLR        Mini Squats        Hip adduction        Gastroc stretch        Heel Raises        Seated hip flexion         Step up & down  6\" up 2x10, lateral 2x10 6\", 6\" step down 2x10 6in up 2x10  4in 15x ea  6in up   Prone hang     2 mins 5lbs    STS 2x10  2x10      Stool HS pulls   2 laps 30 ft 2 laps 30ft    Clinic ambulation   W/o AD 5 laps 40 feet W/o AD 3 laps 50ft W/o AD 3 laps 50ft   Knee flexion AAROM seated Short sitting off plinth 2x10 (5s)        Prone Hamstring curls        Ther Activity                        Gait Training                        Modalities                                               "

## 2023-05-23 ENCOUNTER — OFFICE VISIT (OUTPATIENT)
Dept: PHYSICAL THERAPY | Facility: CLINIC | Age: 58
End: 2023-05-23

## 2023-05-23 ENCOUNTER — TELEPHONE (OUTPATIENT)
Dept: OBGYN CLINIC | Facility: HOSPITAL | Age: 58
End: 2023-05-23

## 2023-05-23 DIAGNOSIS — Z96.651 STATUS POST RIGHT KNEE REPLACEMENT: ICD-10-CM

## 2023-05-23 DIAGNOSIS — M17.11 PRIMARY OSTEOARTHRITIS OF RIGHT KNEE: Primary | ICD-10-CM

## 2023-05-23 DIAGNOSIS — M17.12 PRIMARY OSTEOARTHRITIS OF LEFT KNEE: ICD-10-CM

## 2023-05-23 NOTE — TELEPHONE ENCOUNTER
"Kip Starks PA-C  to Me    TAMARA    1:42 PM   Thank you for the update  This does not sound terribly concerning at this point, as the operative knee may remain erythematous and warm for a few months   We can follow-up with her Thursday as scheduled for further evaluation \"      Pt advised, verbalized understanding and is reassured  She will see the surgeon as scheduled on Thursday  Pt encouraged to call with any questions, concerns or issues      "

## 2023-05-23 NOTE — TELEPHONE ENCOUNTER
"Attempted to reach pt, no answer   left at [phone]  Attempted alternate # and did connect with patient directly  Patient is reporting that surrounding her incision, she is \"still seeing some redness and warmth\"  She reports the redness and warmth are both \"still there and not any worse\"  She also notes there is still persistent swelling of the surgical knee  She denies fevers  She does however state that a few days ago she had some \"yellowish bloody\" drainage from the incision, she notes it has not produced any additional drainage since that time and incision is currently dry  She is scheduled to see the surgeon 5/25 @ 6561, sending to surgeon to please review and advise     "

## 2023-05-23 NOTE — TELEPHONE ENCOUNTER
Caller: patient    Doctor: Jessica Roth    Reason for call: patient is suffering from swelling and redness near the surgery site      Call back#: [phone]

## 2023-05-23 NOTE — PROGRESS NOTES
Daily Note     Today's date: 2023  Patient name: Emily Carrasquillo  : 1965  MRN: [id number]  Referring provider: Sea Reno DO  Dx:   Encounter Diagnosis     ICD-10-CM    1  Primary osteoarthritis of right knee  M17 11       2  Status post right knee replacement  Z96 651       3  Primary osteoarthritis of left knee  M17 12           Start Time: 0800  Stop Time: 0845  Total time in clinic (min): 45 minutes    Subjective: Pt reports that there was some discharge coming out of the top area of her scar but doesn't remember what it looked like and didn't take a picture of it  Pt asked if therapist could examine her knee before starting treatment  Pt states exercises are going well  No pain in her knee or pain in her calf  Objective: See treatment diary below      Assessment: Tolerated treatment well  Patient demonstrated fatigue post treatment, exhibited good technique with therapeutic exercises and would benefit from continued PT  Examined pt's skin and incision, minor tearing noted approximately 1 in from the superior aspect of the incision on the medial side but no discharge visible  Minor redness noted around the upper half of the scar  No tenderness with calf squeeze or sharp pains with ambulating  Pt educated to keep an eye on her symptoms and if there are any changes with her knee prior to her follow-up with her surgeon  Pt re-educated on signs of infection and DVT  Plan: Continue per plan of care  Progress treatment as tolerated         Precautions:     Precautions: Type 2 Diabetes, R TKA protocol   HEP ACCESS CODE: [id number]   IE Post-Op: 23  Surgical Date: 23      Date    Visit Number  17 16 15 14 13   Manuals        Knee PROM  DJA 4-108°  , -4 , -4   Knee mobilization                        Neuro Re-Ed        Patient Education  Examined scar and area around knee 5 min HEP for flex/ext Dave peace        Gait training w/ SPC "  STS        bridges        Step up        Biodex        Squats        lunges Static 15x B       TKE        SLS airex 10x10s  10x10\" flat 5x10s flat 5x10s flat   SAQ   2x10 2x10 2x10   LAQ 2x10 5lbs 2x10  2x10 w/ w/ flexion     SLR 2x10 w/quad set 2x10 w/ quad set  2x10 w/ quad set 2x10 w/ quad set 2x10 w/quad set   Ther Ex        Heel slides Green pball 3x10 Green pball 2x10 Green pball 2x10 Yellow pball 2x10 Yellow pball 2x10   Seated HS str        Recumbent bike 5 mins with subj review 5 min  5 mins with subj review 5 mins while subj review 5 mins while review subj   Ankle Pumps        SAQ        Log rolling        SLR        Mini Squats        Hip adduction        Gastroc stretch        Heel Raises        Seated hip flexion         Step up & down   6\" up 2x10, lateral 2x10 6\", 6\" step down 2x10 6in up 2x10    Prone hang      2 mins 5lbs   STS 2x10 10lbs 2x10  2x10     Stool HS pulls    2 laps 30 ft 2 laps 30ft   Clinic ambulation    W/o AD 5 laps 40 feet W/o AD 3 laps 50ft   Knee flexion AAROM seated  Short sitting off plinth 2x10 (5s)       Prone Hamstring curls        Ther Activity                        Gait Training                        Modalities                                                "

## 2023-05-25 ENCOUNTER — OFFICE VISIT (OUTPATIENT)
Dept: OBGYN CLINIC | Facility: CLINIC | Age: 58
End: 2023-05-25

## 2023-05-25 VITALS
BODY MASS INDEX: 34.93 KG/M2 | SYSTOLIC BLOOD PRESSURE: 126 MMHG | DIASTOLIC BLOOD PRESSURE: 80 MMHG | HEART RATE: 60 BPM | WEIGHT: 185 LBS | HEIGHT: 61 IN

## 2023-05-25 DIAGNOSIS — Z96.651 S/P TOTAL KNEE REPLACEMENT NOT USING CEMENT, RIGHT: Primary | ICD-10-CM

## 2023-05-25 NOTE — PROGRESS NOTES
Assessment/Plan:  1  S/P total knee replacement not using cement, right          Scribe Attestation    I,:  Gloria Berry am acting as a scribe while in the presence of the attending physician :       I,:  Rah Key, DO personally performed the services described in this documentation    as scribed in my presence :         Cecelia upon examination is doing well now 6 weeks right robotically assisted total knee arthroplasty  The redness around her incision is associated with hyperemia and healing rather than infection  The incision is well-healed  I did explain that this is a normal healing process  I did advise her and her daughter today about signs and symptoms to be concerned about in regards to an infection such as significant redness, heat, pain to light touch and diffuse swelling  She may utilize dermal creams for scar care if she wishes at this point  She may she also does not demonstrate signs and symptoms concerning for DVT  I would like her to continue physical therapy as per protocol and finish out with them at their direction  In regards to the presurgical vitamins she does not necessarily have to continue taking these  However, I did encourage her to follow-up with her primary care physician in regards to a vitamin supplementation regimen if she wishes  Cecelia and her mother verbalized understanding and had no further questions  I will see Cecelia back in 6 weeks for repeat clinical evaluation and repeat x-ray  Subjective: 6 week follow-up right robotically assisted total knee arthroplasty    Patient ID: Rachael German is a pleasant 59 y o  female presenting today for her 6-week follow-up of her right knee  She did undergo a robotically assisted total knee arthroplasty  She states that she has been doing well overall with her initial recovery  However, over the past 2 weeks she has noticed some plateauing of her recovery    She notes that the posterior knee pain that was sharp in nature has resolved  However, notes anterolateral and anteromedial knee discomfort with the forced flexion of the knee  She also notes that the discharge that she was experiencing at the more superior aspect of the incision has discontinued  However, notes mild redness around the incision that is not painful to light touch  She denies any fevers or chills concerning for systemic infection  She states that the swelling of the knee is causing her restriction in range of motion and has voiced some frustration due to what she feels is slowing of her recovery  Today she denies any distal paresthesias  Review of Systems   Constitutional: Negative for chills, fever and unexpected weight change  HENT: Negative for hearing loss, nosebleeds and sore throat  Eyes: Negative for pain, redness and visual disturbance  Respiratory: Negative for cough, shortness of breath and wheezing  Cardiovascular: Negative for chest pain, palpitations and leg swelling  Gastrointestinal: Negative for abdominal pain, nausea and vomiting  Endocrine: Negative for polydipsia and polyuria  Genitourinary: Negative for dysuria and hematuria  Musculoskeletal: Positive for arthralgias, joint swelling and myalgias  See HPI   Skin: Negative for rash and wound  Neurological: Negative for dizziness, numbness and headaches  Psychiatric/Behavioral: Negative for decreased concentration and suicidal ideas  The patient is not nervous/anxious            Past Medical History:   Diagnosis Date   • Depression    • Diabetes mellitus [facility]) 2022   • Disease of thyroid gland    • Hyperlipidemia    • Hypertension 2022   • Osteoarthritis        Past Surgical History:   Procedure Laterality Date   •  SECTION      x2   • COLONOSCOPY     • NH ARTHRP KNE CONDYLE&PLATU MEDIAL&LAT COMPARTMENTS Right 2023    Procedure: ARTHROPLASTY KNEE TOTAL W ROBOT - RIGHT - PRESS FIT - OVERNIGHT;  Surgeon: Venice Bright DO; Location: [facility];  Service: Orthopedics       Family History   Problem Relation Age of Onset   • Diabetes Mother    • Cancer Maternal Aunt         lung cancer       Social History     Occupational History   • Not on file   Tobacco Use   • Smoking status: Never   • Smokeless tobacco: Never   Vaping Use   • Vaping Use: Never used   Substance and Sexual Activity   • Alcohol use:  Yes     Alcohol/week: 1 0 standard drink of alcohol     Types: 1 Glasses of wine per week     Comment: social drinking per month   • Drug use: Never   • Sexual activity: Not Currently     Partners: Male     Birth control/protection: Condom Male         Current Outpatient Medications:   •  acetaminophen (TYLENOL) 325 mg tablet, Take 3 tablets (975 mg total) by mouth every 8 (eight) hours, Disp: , Rfl: 0  •  albuterol (PROVENTIL HFA,VENTOLIN HFA) 90 mcg/act inhaler, INHALE 2 PUFFS BY MOUTH FOUR TIMES A DAY FOR 30 DAYS, Disp: , Rfl:   •  amLODIPine (NORVASC) 5 mg tablet, Take 5 mg by mouth daily, Disp: , Rfl:   •  Blood Glucose Monitoring Suppl (ONE TOUCH ULTRA 2) w/Device KIT, USE AS DIRECTED FOR 30 DAYS, Disp: , Rfl:   •  Calcium Carbonate-Vitamin D3 600-400 MG-UNIT TABS, Take 1 tablet by mouth 2 (two) times a day, Disp: , Rfl:   •  D3 High Potency 25 MCG (1000 UT) capsule, Take 1,000 Units by mouth daily, Disp: , Rfl:   •  Diclofenac Sodium (VOLTAREN) 1 %, APPLY 2 GRAMS TO THE AFFECTED AREAS BY TOPICAL ROUTE 4 TIMES PER DAY, Disp: , Rfl:   •  estradiol (ESTRACE) 0 1 mg/g vaginal cream, APPLY 2G INTRAVAGINALLY THREE TIMES A WEEK AT BEDTIME FOR 2 WEEKS, THEN TWICE A WEEK AT BEDTIME FOR 2 WEEKS, AND THEN ONE TIME A WEEK THEREA, Disp: , Rfl:   •  famotidine (PEPCID) 20 mg tablet, TAKE 1 TABLET BY MOUTH EVERY EVENING IF NEEDED, Disp: , Rfl:   •  fluticasone (FLONASE) 50 mcg/act nasal spray, SPRAY 2 SPRAYS INTO EACH NOSTRIL AT BEDTIME, Disp: , Rfl:   •  hydrochlorothiazide (HYDRODIURIL) 12 5 mg tablet, Take 12 5 mg by mouth daily, Disp: , Rfl: •  levothyroxine 88 mcg tablet, TAKE 1 TABLET EVERY DAY BY ORAL ROUTE , Disp: , Rfl:   •  lidocaine (LMX) 4 % cream, APPLY 1 APPLICATION 3 TIMES A DAY BY TOPICAL ROUTE AS NEEDED , Disp: , Rfl:   •  loratadine (CLARITIN) 10 mg tablet, Take 10 mg by mouth daily, Disp: , Rfl:   •  metFORMIN (GLUCOPHAGE) 500 mg tablet, TAKE 1 TABLET TWICE A DAY BY ORAL ROUTE , Disp: , Rfl:   •  omeprazole (PriLOSEC) 40 MG capsule, Take 40 mg by mouth every morning, Disp: , Rfl:   •  OneTouch Ultra test strip, USE AS DIRECTED FOR 90 DAYS TEST DAILY, Disp: , Rfl:   •  Stool Softener 100 MG capsule, TAKE 1 CAPSULE (100 MG TOTAL) BY MOUTH 2 (TWO) TIMES A DAY, Disp: 60 capsule, Rfl: 0  •  atorvastatin (LIPITOR) 10 mg tablet, Take 1 tablet (10 mg total) by mouth daily at bedtime (Patient taking differently: Take 20 mg by mouth daily at bedtime), Disp: 90 tablet, Rfl: 3    No Known Allergies    Objective:  Vitals:    05/25/23 0814   BP: 126/80   Pulse: 60       Body mass index is 34 96 kg/m²  Right Knee Exam     Tenderness   Right knee tenderness location: Mild tenderness anteromedial and anterolateral aspect of knee  Range of Motion   Extension: 0   Flexion: 110     Tests   Varus: negative Valgus: negative  Drawer:  Anterior - negative      Patellar apprehension: negative    Other   Erythema: absent  Scars: present  Sensation: normal  Pulse: present  Effusion: effusion (+1) present    Comments:  Well-healed anterior vertical midline incision without active purulence    Hyperrythema surrounding the superior aspect of the incision  Observations     Right Knee   Positive for effusion (+1)  Physical Exam  Vitals and nursing note reviewed  Constitutional:       Appearance: Normal appearance  HENT:      Head: Normocephalic and atraumatic  Right Ear: External ear normal       Left Ear: External ear normal       Nose: Nose normal    Eyes:      General:         Right eye: No discharge           Left eye: No discharge  Conjunctiva/sclera: Conjunctivae normal    Cardiovascular:      Rate and Rhythm: Normal rate  Pulmonary:      Effort: Pulmonary effort is normal  No respiratory distress  Musculoskeletal:      Cervical back: Normal range of motion and neck supple  Right knee: Effusion (+1) present  Skin:     General: Skin is warm and dry  Neurological:      Mental Status: She is alert and oriented to person, place, and time  I have personally reviewed pertinent films in PACS  No images reviewed today    This document was created using speech voice recognition software  Grammatical errors, random word insertions, pronoun errors, and incomplete sentences are an occasional consequence of this system due to software limitations, ambient noise, and hardware issues  Any formal questions or concerns about content, text, or information contained within the body of this dictation should be directly addressed to the provider for clarification

## 2023-05-26 ENCOUNTER — OFFICE VISIT (OUTPATIENT)
Dept: PHYSICAL THERAPY | Facility: CLINIC | Age: 58
End: 2023-05-26

## 2023-05-26 DIAGNOSIS — M17.11 PRIMARY OSTEOARTHRITIS OF RIGHT KNEE: Primary | ICD-10-CM

## 2023-05-26 DIAGNOSIS — M17.12 PRIMARY OSTEOARTHRITIS OF LEFT KNEE: ICD-10-CM

## 2023-05-26 DIAGNOSIS — Z96.651 STATUS POST RIGHT KNEE REPLACEMENT: ICD-10-CM

## 2023-05-26 NOTE — PROGRESS NOTES
"Daily Note     Today's date: 2023  Patient name: Emily Guevara  : 1965  MRN: [id number]  Referring provider: Apoorva Conklin DO  Dx:   Encounter Diagnosis     ICD-10-CM    1  Primary osteoarthritis of right knee  M17 11       2  Status post right knee replacement  Z96 651       3  Primary osteoarthritis of left knee  M17 12                      Subjective: Cecelia Kong reports her leg still has trouble taking a lot of weight  She has begun to try and start going up stairs with her right leg  Objective: See treatment diary below      Assessment: Tolerated treatment well  Patient would benefit from continued PT to cotninually address limited knee RO into flexion and extension  Plan: Continue per plan of care        Precautions:     Precautions: Type 2 Diabetes, R TKA protocol   HEP ACCESS CODE: [id number]   IE Post-Op: 23  Surgical Date: 23      Date    Visit Number  18 17 16 15 14   Manuals        Knee PROM -3A/0P,  103  DJA 4-108°  , -4   Knee mobilization                        Neuro Re-Ed        Patient Education   Examined scar and area around knee 5 min HEP for flex/ext AAROM     Quad sets 20x5s       Gait training w/ SPC        STS        bridges        Step down 4\" 10x  6\" 10x       Biodex        Squats        lunges  Static 15x B      TKE        SLS  airex 10x10s  10x10\" flat 5x10s flat   SAQ    2x10 2x10   LAQ  2x10 5lbs 2x10  2x10 w/ w/ flexion    SLR  2x10 w/quad set 2x10 w/ quad set  2x10 w/ quad set 2x10 w/ quad set   Ther Ex        Heel slides 20x on slide board Green pball 3x10 Green pball 2x10 Green pball 2x10 Yellow pball 2x10   Seated HS str        Recumbent bike 5 min L1 5 mins with subj review 5 min  5 mins with subj review 5 mins while subj review   Seated knee flexion str lean away/towards 4x3 5s hold each       SAQ        Log rolling        SLR        Mini Squats        Hip adduction        Gastroc stretch        Heel Raises      " "  Seated hip flexion         Step up & down    6\" up 2x10, lateral 2x10 6\", 6\" step down 2x10 6in up 2x10   Prone hang  Supine heel prop  2x3 min       STS  2x10 10lbs 2x10  2x10    Stool HS pulls     2 laps 30 ft   Clinic ambulation     W/o AD 5 laps 40 feet   Knee flexion AAROM seated   Short sitting off plinth 2x10 (5s)      Prone Hamstring curls                                                  "

## 2023-05-30 ENCOUNTER — OFFICE VISIT (OUTPATIENT)
Dept: PHYSICAL THERAPY | Facility: CLINIC | Age: 58
End: 2023-05-30

## 2023-05-30 DIAGNOSIS — M17.11 PRIMARY OSTEOARTHRITIS OF RIGHT KNEE: Primary | ICD-10-CM

## 2023-05-30 DIAGNOSIS — Z96.651 STATUS POST RIGHT KNEE REPLACEMENT: ICD-10-CM

## 2023-05-30 DIAGNOSIS — M17.12 PRIMARY OSTEOARTHRITIS OF LEFT KNEE: ICD-10-CM

## 2023-05-30 NOTE — PROGRESS NOTES
"Daily Note     Today's date: 2023  Patient name: Rachael German  : 1965  MRN: [id number]  Referring provider: Conrad Ware DO  Dx:   Encounter Diagnosis     ICD-10-CM    1  Primary osteoarthritis of right knee  M17 11       2  Status post right knee replacement  Z96 651       3  Primary osteoarthritis of left knee  M17 12                      Subjective: Patient reports she was feeling better, though she had an increase in pain with walking on her knee this morning  Objective: See treatment diary below      Assessment: Tolerated treatment well  Patient continues to have extension lag, though she demonstrates improved tolerance to closed chain exercises without valgus movement of the knee  Continues to rely on momentum for forward step ups without cueing to emphasize glute and quad engagement  Patient demonstrated fatigue post treatment, exhibited good technique with therapeutic exercises and would benefit from continued PT      Plan: Continue per plan of care  Progress treatment as tolerated  Progress challenge as appropriate with irritability       Precautions:     Precautions: Type 2 Diabetes, R TKA protocol   HEP ACCESS CODE: [id number]   IE Post-Op: 23  Surgical Date: 23      Date    Visit Number  19 18 17 16 15   Manuals        Knee PROM  -3A/0P,  103  DJA 4-108°    Knee mobilization                        Neuro Re-Ed        Patient Education    Examined scar and area around knee 5 min HEP for flex/ext AAROM    Quad sets 10x 20x5s      Gait training w/ SPC        STS        bridges        Step down 8\" 2x10 Up  6\" 2x10 lateral 4\" 10x  6\" 10x      Biodex        Squats 2x10       lunges   Static 15x B     TKE        SLS airex 10x10\"  airex 10x10s  10x10\" flat   SAQ     2x10   LAQ 2x10  2x10 5lbs 2x10  2x10 w/ w/ flexion   SLR 10x w/ quad set  2x10 w/quad set 2x10 w/ quad set  2x10 w/ quad set   Ther Ex        Heel slides 20x on ball 20x on slide board Green " "pball 3x10 Green pball 2x10 Green pball 2x10   Seated HS str        Recumbent bike 5 min L1 5 min L1 5 mins with subj review 5 min  5 mins with subj review   Seated knee flexion str lean away/towards  4x3 5s hold each      SAQ        Log rolling        SLR        Mini Squats        Hip adduction        Gastroc stretch        Heel Raises        Seated hip flexion         Step up & down     6\" up 2x10, lateral 2x10 6\", 6\" step down 2x10   Prone hang   Supine heel prop  2x3 min      STS 2x10 10#  2x10 10lbs 2x10  2x10   Stool HS pulls        Clinic ambulation        Knee flexion AAROM seated    Short sitting off plinth 2x10 (5s)     Prone Hamstring curls        Leg Press SL 3x10 30# w/ quad engagement                                           "

## 2023-06-01 ENCOUNTER — OFFICE VISIT (OUTPATIENT)
Dept: PHYSICAL THERAPY | Facility: CLINIC | Age: 58
End: 2023-06-01

## 2023-06-01 DIAGNOSIS — M17.11 PRIMARY OSTEOARTHRITIS OF RIGHT KNEE: Primary | ICD-10-CM

## 2023-06-01 DIAGNOSIS — M17.12 PRIMARY OSTEOARTHRITIS OF LEFT KNEE: ICD-10-CM

## 2023-06-01 DIAGNOSIS — Z96.651 STATUS POST RIGHT KNEE REPLACEMENT: ICD-10-CM

## 2023-06-01 NOTE — PROGRESS NOTES
"Daily Note     Today's date: 2023  Patient name: Heriberto Victor  : 1965  MRN: [id number]  Referring provider: Aimee Salmon DO  Dx:   Encounter Diagnosis     ICD-10-CM    1  Primary osteoarthritis of right knee  M17 11       2  Status post right knee replacement  Z96 651       3  Primary osteoarthritis of left knee  M17 12           Start Time: 08  Stop Time: 0845  Total time in clinic (min): 39 minutes    Subjective: Pt states she feels her knee isn't moving much anymore, bending or straightening  She denies pain prior to the start of the treatment session  Pt wants therapist to look at her knee to make sure everything looks fine  Objective: See treatment diary below      Assessment: Tolerated treatment well  Patient demonstrated fatigue post treatment, exhibited good technique with therapeutic exercises and would benefit from continued PT      Plan: Continue per plan of care  Progress treatment as tolerated         Precautions:     Precautions: Type 2 Diabetes, R TKA protocol   HEP ACCESS CODE: [id number]   IE Post-Op: 23  Surgical Date: 23      Date    Visit Number  20 19 18 17 16   Manuals        Knee PROM   -3A/0P,  103  DJA 4-108°   Knee mobilization                        Neuro Re-Ed        Patient Education     Examined scar and area around knee 5 min HEP for flex/ext AAROM   Quad sets Heel slide on pball into quad set 3x10 10x 20x5s     Gait training w/ SPC        STS        bridges        Step down  8\" 2x10 Up  6\" 2x10 lateral 4\" 10x  6\" 10x     Biodex        Squats 3x10 on airex 2x10      lunges    Static 15x B    TKE        SLS 15x10s airex 10x10\"  airex 10x10s    SAQ        LAQ  2x10  2x10 5lbs 2x10    SLR 2x10 10x w/ quad set  2x10 w/quad set 2x10 w/ quad set    Ther Ex        Heel slides  20x on ball 20x on slide board Green pball 3x10 Green pball 2x10   Seated HS str        Recumbent bike  5 min L1 5 min L1 5 mins with subj review 5 min    Prone " quad str 5x30s       Seated knee flexion str lean away/towards   4x3 5s hold each     SAQ        Log rolling        SLR        Mini Squats        Hip adduction        Gastroc stretch        Heel Raises        Seated hip flexion         Step up & down        Prone hang    Supine heel prop  2x3 min     STS  2x10 10#  2x10 10lbs 2x10    Stool HS pulls        Clinic ambulation        Knee flexion AAROM seated     Short sitting off plinth 2x10 (5s)    Prone Hamstring curls        Leg Press SL 3x10 35lbs w/quad engagement SL 3x10 30# w/ quad engagement

## 2023-06-05 ENCOUNTER — OFFICE VISIT (OUTPATIENT)
Dept: PHYSICAL THERAPY | Facility: CLINIC | Age: 58
End: 2023-06-05
Payer: COMMERCIAL

## 2023-06-05 DIAGNOSIS — M17.12 PRIMARY OSTEOARTHRITIS OF LEFT KNEE: ICD-10-CM

## 2023-06-05 DIAGNOSIS — Z96.651 S/P TOTAL KNEE REPLACEMENT NOT USING CEMENT, RIGHT: ICD-10-CM

## 2023-06-05 DIAGNOSIS — M17.11 PRIMARY OSTEOARTHRITIS OF RIGHT KNEE: Primary | ICD-10-CM

## 2023-06-05 DIAGNOSIS — Z96.651 STATUS POST RIGHT KNEE REPLACEMENT: ICD-10-CM

## 2023-06-05 PROCEDURE — 97110 THERAPEUTIC EXERCISES: CPT

## 2023-06-05 PROCEDURE — 97112 NEUROMUSCULAR REEDUCATION: CPT

## 2023-06-05 RX ORDER — DOCUSATE SODIUM 100 MG
CAPSULE ORAL
Qty: 60 CAPSULE | Refills: 0 | Status: SHIPPED | OUTPATIENT
Start: 2023-06-05

## 2023-06-05 NOTE — PROGRESS NOTES
"Daily Note     Today's date: 2023  Patient name: Vicci Bumpers  : 1965  MRN: [id number]  Referring provider: Zina Arora DO  Dx:   Encounter Diagnosis     ICD-10-CM    1  Primary osteoarthritis of right knee  M17 11       2  Status post right knee replacement  Z96 651       3  Primary osteoarthritis of left knee  M17 12           Start Time: 0800  Stop Time: 0850  Total time in clinic (min): 50 minutes    Subjective: Pt reports her knee is doing well today, minimal discomfort prior to the start of her treatment session  However, pt feels her knee isn't straightening more  She has noticed it can bend more because she is able to cross her legs better now  No new complaints  Objective: See treatment diary below      Assessment: Tolerated treatment well  Patient demonstrated fatigue post treatment, exhibited good technique with therapeutic exercises and would benefit from continued PT      Plan: Continue per plan of care  Progress treatment as tolerated         Precautions:     Precautions: Type 2 Diabetes, R TKA protocol   HEP ACCESS CODE: [id number]   IE Post-Op: 23  Surgical Date: 23      Date    Visit Number  21 20 19 18 17   Manuals        Knee PROM -2A/0P, 112A/115P   -3A/0P,  103    Knee mobilization                        Neuro Re-Ed        Patient Education      Examined scar and area around knee   Quad sets Prone knee flex into quad set off table 3x10 3lbs Heel slide on pball into quad set 3x10 10x 20x5s    Gait training w/ SPC        STS 2in under LLE holding 10lbs 2x10       bridges        Step down   8\" 2x10 Up  6\" 2x10 lateral 4\" 10x  6\" 10x    Biodex        wobbleboard 2x30s ML/AP ea       Squats 3x10 on airex 3x10 on airex 2x10     lunges     Static 15x B   TKE        SLS  15x10s airex 10x10\"  airex 10x10s   SAQ        LAQ   2x10  2x10 5lbs   SLR  2x10 10x w/ quad set  2x10 w/quad set   Ther Ex        Heel slides   20x on ball 20x on slide board Green " pball 3x10   Seated HS str        Recumbent bike   5 min L1 5 min L1 5 mins with subj review   Prone quad str 4x30s 5x30s      Seated knee flexion str lean away/towards    4x3 5s hold each    SAQ        Log rolling        SLR        Mini Squats        Hip adduction        Gastroc stretch        Heel Raises        Seated hip flexion         Step up & down        Prone hang     Supine heel prop  2x3 min    STS   2x10 10#  2x10 10lbs   Stool HS pulls        Clinic ambulation        Knee flexion AAROM seated        Prone Hamstring curls        Leg Press SL 3x10 35lbs w/quad engagement SL 3x10 35lbs w/quad engagement SL 3x10 30# w/ quad engagement

## 2023-06-06 ENCOUNTER — OFFICE VISIT (OUTPATIENT)
Dept: CARDIOLOGY CLINIC | Facility: CLINIC | Age: 58
End: 2023-06-06
Payer: COMMERCIAL

## 2023-06-06 VITALS
HEART RATE: 66 BPM | BODY MASS INDEX: 33.23 KG/M2 | OXYGEN SATURATION: 97 % | SYSTOLIC BLOOD PRESSURE: 126 MMHG | WEIGHT: 176 LBS | DIASTOLIC BLOOD PRESSURE: 74 MMHG | HEIGHT: 61 IN

## 2023-06-06 DIAGNOSIS — R06.09 DYSPNEA ON EXERTION: ICD-10-CM

## 2023-06-06 DIAGNOSIS — E78.5 DYSLIPIDEMIA: ICD-10-CM

## 2023-06-06 DIAGNOSIS — E11.9 TYPE 2 DIABETES MELLITUS WITHOUT COMPLICATION, WITHOUT LONG-TERM CURRENT USE OF INSULIN (HCC): ICD-10-CM

## 2023-06-06 DIAGNOSIS — R94.31 ABNORMAL EKG: ICD-10-CM

## 2023-06-06 DIAGNOSIS — I11.9 HYPERTENSIVE HEART DISEASE WITHOUT HEART FAILURE: ICD-10-CM

## 2023-06-06 DIAGNOSIS — E03.9 HYPOTHYROIDISM, UNSPECIFIED TYPE: ICD-10-CM

## 2023-06-06 DIAGNOSIS — Z82.49 FAMILY HISTORY OF EARLY CAD: ICD-10-CM

## 2023-06-06 PROCEDURE — 99214 OFFICE O/P EST MOD 30 MIN: CPT | Performed by: INTERNAL MEDICINE

## 2023-06-06 RX ORDER — ATORVASTATIN CALCIUM 10 MG/1
20 TABLET, FILM COATED ORAL
Qty: 90 TABLET | Refills: 1
Start: 2023-06-06

## 2023-06-06 NOTE — PROGRESS NOTES
Progress note  - Cardiology Office  [facility]  Camilo Ash 59 y o  female MRN: [id number]  : 1965  Unit/Bed#:  Encounter: [id number]      Assessment:     1  Hypertensive heart disease without heart failure    2  Dyspnea on exertion    3  Dyslipidemia    4  Type 2 diabetes mellitus without complication, without long-term current use of insulin ([address] )    5  Family history of early CAD    10  Hypothyroidism, unspecified type    7  Abnormal EKG        Discussion summary and Plan:    1  Abnormal EKG with family history of premature coronary artery disease  Patient's EKG shows T wave versions in lateral leads  Nuclear stress test shows no ischemia    2  Dyspnea on exertion  No change in symptoms  Echo shows an LV solid function nuclear shows no ischemia  3   Dyslipidemia continue statins  Advised to check periodically blood test     4   Hypertensive heart disease with no evidence of heart failure  She is on chlorothiazide and amlodipine  Blood pressure is acceptable  Same medications  5   Chronic bilateral both knee pain with osteoarthritis  Management as per orthopedics    6  Obesity with a BMI around 32 encouraged her to lose weight  7   Hypothyroidism  On levothyroxine  8   Family history of premature coronary artery disease  Pathophysiology of coronary artery disease discussed with her  Current Rx follow-up in 9 months         Patient / Kayla Gabraden was advised and educated to call our office  immediately if  patient has any new symptoms of chest pain/shortness of breath, near-syncope, syncope, light headedness sustained palpitations or any other cardiovascular symptoms before their scheduled follow-up appointment  Office number was provided #[phone]  Thank you for your consultation  If you have any question please call me at [phone]    Counseling :  A description of the counseling  Goals and Barriers    Patient's ability to self care: Yes  Medication side effect reviewed with patient in detail and all their questions answered to their satisfaction  Primary Care Physician : Ricco Mayen MD      HPI :     Vernon Gallardo is a 59y o  year old female who was referred by primary care doctor for preoperative clearance and she has multiple cardiac risk factors  Patient was medical history significant for pretension, hypothyroidism, diabetes mellitus, dyslipidemia, obesity with a BMI around 30 who was noted to have difficulty in walking and some exertional shortness of breath when she does her activities  She now needs preoperative clearance  Her EKG shows T wave inversions in lateral precordial leads  She had a family history of heart disease her mother had congestive heart failure her sister suddenly  of heart attack possibly as a sudden cardiac death at age of 58  She was diagnosed to have diabetes mellitus but she is on statins and blood pressure medications  She is not very active due to her knee issues  No other recent surgery    2023  Reviewed  Patient came for follow-up  She has medical history significant for hypertension, hypothyroidism, diabetes mellitus, dyslipidemia, obesity BMI around same who came for follow-up  She had a cardiac work-up done in the form of stress test and echo Doppler which has been acceptable previous EKG with T wave inversion lateral precordial leads  She had family history of heart disease with your sister had sudden  of heart attacks    Review of Systems   Constitutional: Negative for activity change, chills, diaphoresis, fever and unexpected weight change  HENT: Negative for congestion  Eyes: Negative for discharge and redness  Respiratory: Negative for cough, chest tightness, shortness of breath and wheezing  Cardiovascular: Negative  Negative for chest pain, palpitations and leg swelling  Gastrointestinal: Negative for abdominal pain, diarrhea and nausea     Endocrine: Negative  Genitourinary: Negative for decreased urine volume and urgency  Musculoskeletal: Positive for arthralgias and gait problem  Negative for back pain  Skin: Negative for rash and wound  Allergic/Immunologic: Negative  Neurological: Negative for dizziness, seizures, syncope, weakness, light-headedness and headaches  Hematological: Negative  Psychiatric/Behavioral: Negative for agitation and confusion  The patient is nervous/anxious          Historical Information   Past Medical History:   Diagnosis Date   • Depression    • Diabetes mellitus ([address] ) 2022   • Disease of thyroid gland    • Hyperlipidemia    • Hypertension 2022   • Osteoarthritis      Past Surgical History:   Procedure Laterality Date   •  SECTION      x2   • COLONOSCOPY     • VT ARTHRP KNE CONDYLE&PLATU MEDIAL&LAT COMPARTMENTS Right 2023    Procedure: ARTHROPLASTY KNEE TOTAL W ROBOT - RIGHT - PRESS FIT - OVERNIGHT;  Surgeon: Dariusz Clemente DO;  Location: [facility] OR;  Service: Orthopedics     Social History     Substance and Sexual Activity   Alcohol Use Yes   • Alcohol/week: 1 0 standard drink of alcohol   • Types: 1 Glasses of wine per week    Comment: social drinking per month     Social History     Substance and Sexual Activity   Drug Use Never     Social History     Tobacco Use   Smoking Status Never   Smokeless Tobacco Never     Family History:   Family History   Problem Relation Age of Onset   • Diabetes Mother    • Cancer Maternal Aunt         lung cancer       Meds/Allergies     No Known Allergies    Current Outpatient Medications:   •  acetaminophen (TYLENOL) 325 mg tablet, Take 3 tablets (975 mg total) by mouth every 8 (eight) hours, Disp: , Rfl: 0  •  albuterol (PROVENTIL HFA,VENTOLIN HFA) 90 mcg/act inhaler, INHALE 2 PUFFS BY MOUTH FOUR TIMES A DAY FOR 30 DAYS, Disp: , Rfl:   •  amLODIPine (NORVASC) 5 mg tablet, Take 5 mg by mouth daily, Disp: , Rfl:   •  atorvastatin (LIPITOR) 10 "mg tablet, Take 2 tablets (20 mg total) by mouth daily at bedtime, Disp: 90 tablet, Rfl: 1  •  Blood Glucose Monitoring Suppl (ONE TOUCH ULTRA 2) w/Device KIT, USE AS DIRECTED FOR 30 DAYS, Disp: , Rfl:   •  Calcium Carbonate-Vitamin D3 600-400 MG-UNIT TABS, Take 1 tablet by mouth 2 (two) times a day, Disp: , Rfl:   •  D3 High Potency 25 MCG (1000 UT) capsule, Take 1,000 Units by mouth daily, Disp: , Rfl:   •  Diclofenac Sodium (VOLTAREN) 1 %, APPLY 2 GRAMS TO THE AFFECTED AREAS BY TOPICAL ROUTE 4 TIMES PER DAY, Disp: , Rfl:   •  estradiol (ESTRACE) 0 1 mg/g vaginal cream, APPLY 2G INTRAVAGINALLY THREE TIMES A WEEK AT BEDTIME FOR 2 WEEKS, THEN TWICE A WEEK AT BEDTIME FOR 2 WEEKS, AND THEN ONE TIME A WEEK THEREA, Disp: , Rfl:   •  famotidine (PEPCID) 20 mg tablet, TAKE 1 TABLET BY MOUTH EVERY EVENING IF NEEDED, Disp: , Rfl:   •  fluticasone (FLONASE) 50 mcg/act nasal spray, SPRAY 2 SPRAYS INTO EACH NOSTRIL AT BEDTIME, Disp: , Rfl:   •  hydrochlorothiazide (HYDRODIURIL) 12 5 mg tablet, Take 12 5 mg by mouth daily, Disp: , Rfl:   •  levothyroxine 88 mcg tablet, TAKE 1 TABLET EVERY DAY BY ORAL ROUTE , Disp: , Rfl:   •  lidocaine (LMX) 4 % cream, APPLY 1 APPLICATION 3 TIMES A DAY BY TOPICAL ROUTE AS NEEDED , Disp: , Rfl:   •  loratadine (CLARITIN) 10 mg tablet, Take 10 mg by mouth daily, Disp: , Rfl:   •  metFORMIN (GLUCOPHAGE) 500 mg tablet, TAKE 1 TABLET TWICE A DAY BY ORAL ROUTE , Disp: , Rfl:   •  omeprazole (PriLOSEC) 40 MG capsule, Take 40 mg by mouth every morning, Disp: , Rfl:   •  OneTouch Ultra test strip, USE AS DIRECTED FOR 90 DAYS TEST DAILY, Disp: , Rfl:   •  Stool Softener 100 MG capsule, TAKE 1 CAPSULE (100 MG TOTAL) BY MOUTH 2 (TWO) TIMES A DAY, Disp: 60 capsule, Rfl: 0    Vitals: Blood pressure 126/74, pulse 66, height 5' 1\" (1 549 m), weight 79 8 kg (176 lb), SpO2 97 %  ?  Body mass index is 33 25 kg/m²    Wt Readings from Last 3 Encounters:   06/06/23 79 8 kg (176 lb)   05/25/23 83 9 kg (185 lb) " "  04/26/23 82 1 kg (181 lb)     Vitals:    06/06/23 1621   Weight: 79 8 kg (176 lb)     BP Readings from Last 3 Encounters:   06/06/23 126/74   05/25/23 126/80   04/26/23 130/74         Physical Exam  Constitutional:       General: She is not in acute distress  Appearance: She is well-developed  She is not diaphoretic  Neck:      Thyroid: No thyromegaly  Vascular: No JVD  Trachea: No tracheal deviation  Cardiovascular:      Rate and Rhythm: Normal rate and regular rhythm  Heart sounds: S1 normal and S2 normal  Heart sounds not distant  Murmur heard  Systolic (ejection) murmur is present with a grade of 2/6  No friction rub  No gallop  No S3 or S4 sounds  Pulmonary:      Effort: Pulmonary effort is normal  No respiratory distress  Breath sounds: Normal breath sounds  No wheezing or rales  Chest:      Chest wall: No tenderness  Abdominal:      General: Bowel sounds are normal  There is no distension  Palpations: Abdomen is soft  Tenderness: There is no abdominal tenderness  Musculoskeletal:         General: No deformity  Cervical back: Neck supple  Skin:     General: Skin is warm and dry  Coloration: Skin is not pale  Findings: No rash  Neurological:      Mental Status: She is alert and oriented to person, place, and time  Psychiatric:         Behavior: Behavior normal          Judgment: Judgment normal              Diagnostic Studies Review Cardio:    Nuclear stress test   Nuclear stress test done March 2023 shows no ischemia EF 73%  Echo Doppler  Echo Doppler shows EF 60%, mild valvular disease  EKG:  Twelve-lead EKG done on 3/10/2023 normal sinus rhythm heart rate 61 bpm with a T wave normality in lateral precordial leads cannot rule out ischemia  No old EKG for comparison  Dr Parviz Bran MD [facility] [place]      \"This note was completed in part utilizing m-modal fluency direct voice recognition software     Grammatical errors, random " "word insertion, spelling mistakes, and incomplete sentences may be an occasional consequence of the system secondary to software limitations, ambient noise and hardware issues  Please read the chart carefully and recognize, using context, where substitutions have occurred  If you have any questions or concerns about the context, text or information contained within the body of this dictation, please contact myself, the provider, for further clarification  \"  "

## 2023-06-09 ENCOUNTER — OFFICE VISIT (OUTPATIENT)
Dept: PHYSICAL THERAPY | Facility: CLINIC | Age: 58
End: 2023-06-09
Payer: COMMERCIAL

## 2023-06-09 DIAGNOSIS — M17.12 PRIMARY OSTEOARTHRITIS OF LEFT KNEE: ICD-10-CM

## 2023-06-09 DIAGNOSIS — M17.11 PRIMARY OSTEOARTHRITIS OF RIGHT KNEE: Primary | ICD-10-CM

## 2023-06-09 DIAGNOSIS — Z96.651 STATUS POST RIGHT KNEE REPLACEMENT: ICD-10-CM

## 2023-06-09 PROCEDURE — 97112 NEUROMUSCULAR REEDUCATION: CPT

## 2023-06-09 PROCEDURE — 97110 THERAPEUTIC EXERCISES: CPT

## 2023-06-09 NOTE — PROGRESS NOTES
"Daily Note     Today's date: 2023  Patient name: Melida Jim  : 1965  MRN: [id number]  Referring provider: Shantell Eldridge DO  Dx:   Encounter Diagnosis     ICD-10-CM    1  Primary osteoarthritis of right knee  M17 11       2  Status post right knee replacement  Z96 651       3  Primary osteoarthritis of left knee  M17 12           Start Time: 0800  Stop Time: 0845  Total time in clinic (min): 45 minutes    Subjective: Pt states that her knee is doing fine, no new changes since her last treatment session  Continues with stiffness and pain in her R knee  Objective: See treatment diary below      Assessment: Tolerated treatment well  Patient demonstrated fatigue post treatment, exhibited good technique with therapeutic exercises and would benefit from continued PT      Plan: Continue per plan of care  Progress treatment as tolerated         Precautions:     Precautions: Type 2 Diabetes, R TKA protocol   HEP ACCESS CODE: [id number]   IE Post-Op: 23  Surgical Date: 23      Date    Visit Number  22 21 20 19 18   Manuals        Knee PROM  -2A/0P, 112A/115P   -3A/0P,  103   Knee mobilization                        Neuro Re-Ed        Patient Education         Quad sets  Prone knee flex into quad set off table 3x10 3lbs Heel slide on pball into quad set 3x10 10x 20x5s   Gait training w/ SPC        STS 2in under LLE holding 10lbs 2x10 2in under LLE holding 10lbs 2x10      bridges W/HS curl on pball 15x    Bridges w/LE ext 2x10       Step down    8\" 2x10 Up  6\" 2x10 lateral 4\" 10x  6\" 10x   Biodex        wobbleboard 2x40s ML/AP ea 2x30s ML/AP ea      Squats  3x10 on airex 3x10 on airex 2x10    lunges        TKE        SLS airex 3x30s  15x10s airex 10x10\"    SAQ        LAQ    2x10    SLR   2x10 10x w/ quad set    Ther Ex        Heel slides    20x on ball 20x on slide board   Seated HS str        Recumbent bike    5 min L1 5 min L1   Prone quad str  4x30s 5x30s     Seated knee " flexion str lean away/towards     4x3 5s hold each   SAQ        Log rolling        SLR        Mini Squats        Hip adduction        Gastroc stretch        Heel Raises        Seated hip flexion         Step up & down        Prone hang      Supine heel prop  2x3 min   STS    2x10 10#    Stool HS pulls 1 lap 15ft SL  2 laps 15ft DL       Clinic ambulation        Knee flexion AAROM seated        Prone Hamstring curls        Leg Press SL 3x10  SL 3x10 35lbs w/quad engagement SL 3x10 35lbs w/quad engagement SL 3x10 30# w/ quad engagement

## 2023-06-12 ENCOUNTER — OFFICE VISIT (OUTPATIENT)
Dept: PHYSICAL THERAPY | Facility: CLINIC | Age: 58
End: 2023-06-12
Payer: COMMERCIAL

## 2023-06-12 DIAGNOSIS — Z96.651 STATUS POST RIGHT KNEE REPLACEMENT: ICD-10-CM

## 2023-06-12 DIAGNOSIS — M17.11 PRIMARY OSTEOARTHRITIS OF RIGHT KNEE: Primary | ICD-10-CM

## 2023-06-12 DIAGNOSIS — M17.12 PRIMARY OSTEOARTHRITIS OF LEFT KNEE: ICD-10-CM

## 2023-06-12 PROCEDURE — 97110 THERAPEUTIC EXERCISES: CPT

## 2023-06-12 PROCEDURE — 97112 NEUROMUSCULAR REEDUCATION: CPT

## 2023-06-12 NOTE — PROGRESS NOTES
"Daily Note     Today's date: 2023  Patient name: Vernon Gallardo  : 1965  MRN: [id number]  Referring provider: Riley Bonds DO  Dx:   Encounter Diagnosis     ICD-10-CM    1  Primary osteoarthritis of right knee  M17 11       2  Status post right knee replacement  Z96 651       3  Primary osteoarthritis of left knee  M17 12           Start Time: 803  Stop Time: 4954  Total time in clinic (min): 40 minutes    Subjective: Pt states that her knee feels good today, continued stiffness, but no complaints of pain this morning  No new complaints  Objective: See treatment diary below      Assessment: Tolerated treatment well  Patient demonstrated fatigue post treatment, exhibited good technique with therapeutic exercises and would benefit from continued PT      Plan: Continue per plan of care  Progress treatment as tolerated         Precautions:     Precautions: Type 2 Diabetes, R TKA protocol   HEP ACCESS CODE: [id number]   IE Post-Op: 23  Surgical Date: 23      Date    Visit Number  23 22 21 20 19   Manuals        Knee PROM   -2A/0P, 112A/115P     Knee mobilization                        Neuro Re-Ed        Patient Education         Quad sets   Prone knee flex into quad set off table 3x10 3lbs Heel slide on pball into quad set 3x10 10x   Gait training w/ SPC        STS  2in under LLE holding 10lbs 2x10 2in under LLE holding 10lbs 2x10     bridges W/HS curl on pball 2x10 W/HS curl on pball 15x    Bridges w/LE ext 2x10      Step down 8\" 2x10 up & down      8\" 2x10 Up  6\" 2x10 lateral   Biodex        wobbleboard  2x40s ML/AP ea 2x30s ML/AP ea     Squats   3x10 on airex 3x10 on airex 2x10   lunges        TKE        SLS airex 3x30s airex 3x30s  15x10s airex 10x10\"   SAQ        LAQ     2x10   Heel raises off foam 3x10       SLR 2x10 R   2x10 10x w/ quad set   Ther Ex        Heel slides     20x on ball   Seated HS str        Recumbent bike     5 min L1   Prone quad str   4x30s " 5x30s    Seated knee flexion str lean away/towards        SAQ        Log rolling        SLR        Mini Squats        Hip adduction        Gastroc stretch        Heel Raises        Seated hip flexion         Step up & down        Prone hang         STS     2x10 10#   Stool HS pulls 3 laps 20ft DL 1 lap 15ft SL  2 laps 15ft DL      Clinic ambulation        Knee flexion AAROM seated        Prone Hamstring curls        Leg Press SL 3x10 40lbs SL 3x10 40lbs SL 3x10 35lbs w/quad engagement SL 3x10 35lbs w/quad engagement SL 3x10 30# w/ quad engagement

## 2023-06-15 ENCOUNTER — EVALUATION (OUTPATIENT)
Dept: PHYSICAL THERAPY | Facility: CLINIC | Age: 58
End: 2023-06-15
Payer: COMMERCIAL

## 2023-06-15 DIAGNOSIS — M17.12 PRIMARY OSTEOARTHRITIS OF LEFT KNEE: ICD-10-CM

## 2023-06-15 DIAGNOSIS — M17.11 PRIMARY OSTEOARTHRITIS OF RIGHT KNEE: Primary | ICD-10-CM

## 2023-06-15 DIAGNOSIS — Z96.651 STATUS POST RIGHT KNEE REPLACEMENT: ICD-10-CM

## 2023-06-15 PROCEDURE — 97110 THERAPEUTIC EXERCISES: CPT

## 2023-06-15 PROCEDURE — 97112 NEUROMUSCULAR REEDUCATION: CPT

## 2023-06-15 NOTE — PROGRESS NOTES
"PT Re-Evaluation     Today's date: 6/15/2023  Patient name: Vanessa Barreto  : 1965  MRN: [id number]  Referring provider: Pinky Giang DO  Dx:   Encounter Diagnosis     ICD-10-CM    1  Primary osteoarthritis of right knee  M17 11       2  Status post right knee replacement  Z96 651       3  Primary osteoarthritis of left knee  M17 12           Start Time: 3531  Stop Time: 09  Total time in clinic (min): 45 minutes    Assessment  Assessment details: Patient, Vanessa Barreto, arrives to outpatient physical therapy for her 24th visit with primary complaints of acute knee pain following a R TKA on 23  Cecelia demonstrates continued objective improvements in R knee flexion and extension A/PROM testing, however pain and restrictions noted with flexion, along with improved strength globally in the LE  Additionally, she demonstrated significant improvements with functional test scores, demonstrating improved activity tolerance and ambulation tolerance around the home  She is able to demonstrate a normalized gait pattern, however when she fatigues she begins to externally rotate her RLE and \"drag\" her foot slightly  She continues to have limitations with descneding stairs with step to pattern, ambulating long distances, sitting/standing for long periods of time  These limitations impact Cecelia to participate in self-care, ADL's, and recreational activities without compensations  Cecelia would likely benefit from continued skilled physical therapy to address their aforementioned impairments and limitations in order to return to prior level of functioning and maximize independent mobility       Impairments: abnormal coordination, abnormal gait, abnormal or restricted ROM, abnormal movement, activity intolerance, impaired balance, impaired physical strength, pain with function, safety issue, weight-bearing intolerance and poor body mechanics    Symptom irritability: lowUnderstanding of Dx/Px/POC: good   Prognosis: good " Goals  STG (2-6 weeks)  1  Patient will be independent with comprehensive HEP  -met  2  Patient will demonstrate an increase in active knee ROM to WNL in order to improve gait  -met  3  Patient will improve knee MMT by a score of 1 in order to perform ADL's -met    LTG (6-12 weeks)  1  Patient will be able to work a full shift with less than 3 out of 10 pain  -progressing  2  Patient will ascend and descend full flight of stairs reciprocally and without increase in pain in order to complete ADL's  -progressing  3  Patient will demonstrate full functional squat with proper knee mechanics with no reports of pain  -partially met  4  Patient will return to their previous exercise level using corrected mechanics without increase in pain  -progressing     Plan  Patient would benefit from: skilled physical therapy and PT eval  Planned modality interventions: cryotherapy, TENS and thermotherapy: hydrocollator packs  Planned therapy interventions: ADL retraining, abdominal trunk stabilization, activity modification, IADL retraining, joint mobilization, manual therapy, massage, motor coordination training, muscle pump exercises, neuromuscular re-education, patient education, ADL training, balance, balance/weight bearing training, body mechanics training, postural training, self care, coordination, strengthening, stretching, therapeutic activities, therapeutic exercise, flexibility, fine motor coordination training, gait training, graded activity, functional ROM exercises, graded exercise, graded motor, transfer training, therapeutic training and home exercise program  Frequency: 1-2x/week  Duration in weeks: 6  Treatment plan discussed with: patient         Subjective Evaluation     History of Present Illness  Date of surgery: 4/11/2023  Mechanism of injury: surgery  Mechanism of injury: Post Op 4/14/23: Pt states that bending her knee is still challenging, can't get to where she wants to   Pt feels she is doing well and feels improvement every day  Pt states compliance with her HEP  Pt is able to go up steps and doesn't have to think about it as much anymore  Pt is able to move faster, however if she is in pain she notices she drags her leg  Pt went to the gym 1x last week and felt good with it, wants to go at least 3x/week  Going down steps is also getting better but is still tough at times  Pt states being unable to stand for more than 1-1 5 hours without needing a break                 Recurrent probem    Quality of life: good    Pain  Current pain ratin  At best pain ratin  At worst pain ratin (bending her knee)  Quality: burning, sharp, tight, discomfort and squeezing  Relieving factors: rest, relaxation and ice  Aggravating factors: standing, stair climbing, running and sitting  Progression: worsening     Social Support  Steps to enter house: yes  2  17  Lives in: multiple-level home  Lives with: alone     Employment status: not working  Exercise history: Likes to go on [place]       Diagnostic Tests  X-ray: abnormal  Treatments  Previous treatment: physical therapy and injection treatment  Patient Goals  Patient goals for therapy: decreased pain, increased strength, independence with ADLs/IADLs, return to sport/leisure activities, increased motion and improved balance              Objective      Tenderness   Left Knee   No tenderness in the lateral joint line, LCL (distal), LCL (proximal), MCL (distal), MCL (proximal) and medial joint line       Right Knee   Tenderness in the medial and lateral joint line, medial and lateral aspects of patella     Active Range of Motion   Left Knee   Flexion: 132 degrees   Extension: 0 degrees      Right Knee   Flexion: 112 degrees with pain  Extension: -1 degrees with pain     Passive Range of Motion   Left Knee   Flexion: 138 degrees      Right Knee   Flexion: 119 degrees with pain  Extension: 1 degrees with pain     Mobility   Patellar Mobility:   Left Knee   WFL: medial and "lateral    Hypomobile: left superior and left inferior     Patellar Static Positioning   Left Knee: goldie  Right Knee: goldie     Strength/Myotome Testing      Left Hip   Planes of Motion   Flexion: 4+  Abduction: 4+  Adduction: 4+     Right Hip   Planes of Motion   Flexion: 3+  Abduction: 4+  Adduction: 4+     Left Knee   Flexion: 4+  Extension: 4+  Quadriceps contraction: fair     Right Knee   Flexion: 4-   Extension: 4  Quadriceps contraction: fair     Ambulation   Weight-Bearing Status   Weight-Bearing Status (Left): full weight bearing   Weight-Bearing Status (Right): full weight-bearing    Distance in feet: 100  Assistive device used: none     Ambulation: Level Surfaces   Ambulation with assistive device: independent  Ambulation without assistive device: independent     Observational Gait   Stride length within functional limits  Normalized stance and swing time b/l   Left foot contact pattern: foot flat  Right foot contact pattern: foot flat  Left arm swing: within functional limits  Right arm swing: within functional limits  Base of support: normal  Left stance time comments: WFL  Left swing time comments: WFL  Right stance time comments: [facility]  Right swing time comments: [facility]     Functional Assessment        Squat    Minor weight shift to LLE, tightness and discomfort noted       Forward Step Up 6\"   Left Leg  Increased forward trunk lean       Right Leg  Increased forward trunk lean       Forward Step Up 8\"   Left Leg  Increased forward trunk lean       Forward Step Down 6\"   Left Leg  Contralateral toe touch with pain, minor trunk rotation       Right Leg  Contralateral toe touch with pain, minor trunk rotation       Forward Step Down 8\"   Left Leg  Contralateral toe touch with pain, minor trunk rotation       Right Leg  Contralateral toe touch with pain, minor trunk rotation       Comments    5 STS: 13 32s   TU 97s     SLS:  L: 25s  R: 18s        Precautions: Type 2 Diabetes, R TKA protocol   HEP ACCESS " "CODE: [id number]   IE Post-Op: 4/14/23  Surgical Date: 4/11/23      Date 6/15 6/12 6/9 6/5 6/1   Visit Number  24 23 22 21 20   Manuals        Knee PROM    -2A/0P, 112A/115P    Knee mobilization                        Neuro Re-Ed        Patient Education         Quad sets    Prone knee flex into quad set off table 3x10 3lbs Heel slide on pball into quad set 3x10   Gait training w/ SPC        STS   2in under LLE holding 10lbs 2x10 2in under LLE holding 10lbs 2x10    bridges  W/HS curl on pball 2x10 W/HS curl on pball 15x    Bridges w/LE ext 2x10     Step down  8\" 2x10 up & down        Biodex        wobbleboard   2x40s ML/AP ea 2x30s ML/AP ea    Squats    3x10 on airex 3x10 on airex   lunges        TKE        SLS  airex 3x30s airex 3x30s  15x10s   SAQ        LAQ        Heel raises off foam  3x10      SLR  2x10 R   2x10   Ther Ex        Heel slides        Seated HS str        Recumbent bike        Prone quad str    4x30s 5x30s   Seated knee flexion str lean away/towards        SAQ        Log rolling        SLR        Mini Squats        Hip adduction        Gastroc stretch        Heel Raises        Seated hip flexion         Step up & down        Prone hang         STS        Stool HS pulls  3 laps 20ft DL 1 lap 15ft SL  2 laps 15ft DL     Clinic ambulation        Knee flexion AAROM seated        Prone Hamstring curls        Leg Press  SL 3x10 40lbs SL 3x10 40lbs SL 3x10 35lbs w/quad engagement SL 3x10 35lbs w/quad engagement                         "

## 2023-06-16 ENCOUNTER — APPOINTMENT (OUTPATIENT)
Dept: PHYSICAL THERAPY | Facility: CLINIC | Age: 58
End: 2023-06-16
Payer: COMMERCIAL

## 2023-06-19 ENCOUNTER — OFFICE VISIT (OUTPATIENT)
Dept: PHYSICAL THERAPY | Facility: CLINIC | Age: 58
End: 2023-06-19
Payer: COMMERCIAL

## 2023-06-19 DIAGNOSIS — Z96.651 STATUS POST RIGHT KNEE REPLACEMENT: ICD-10-CM

## 2023-06-19 DIAGNOSIS — M17.11 PRIMARY OSTEOARTHRITIS OF RIGHT KNEE: Primary | ICD-10-CM

## 2023-06-19 DIAGNOSIS — M17.12 PRIMARY OSTEOARTHRITIS OF LEFT KNEE: ICD-10-CM

## 2023-06-19 PROCEDURE — 97112 NEUROMUSCULAR REEDUCATION: CPT

## 2023-06-19 NOTE — PROGRESS NOTES
"Daily Note     Today's date: 2023  Patient name: Myriam Sarabia  : 1965  MRN: [id number]  Referring provider: Dorita March DO  Dx:   Encounter Diagnosis     ICD-10-CM    1  Primary osteoarthritis of right knee  M17 11       2  Status post right knee replacement  Z96 651       3  Primary osteoarthritis of left knee  M17 12           Start Time: 0800  Stop Time: 0845  Total time in clinic (min): 45 minutes    Subjective: Pt states that her knee is doing well today, states continued tightness but denies any pain prior to the start of her treatment session  Objective: See treatment diary below      Assessment: Tolerated treatment well  Patient demonstrated fatigue post treatment, exhibited good technique with therapeutic exercises and would benefit from continued PT      Plan: Continue per plan of care  Progress treatment as tolerated         Precautions: Type 2 Diabetes, R TKA protocol   HEP ACCESS CODE: [id number]   IE Post-Op: 23  Surgical Date: 23      Date 6/19 6/15 6/12 6/9 6/5   Visit Number  25 24 23 22 21   Manuals        Knee PROM     -2A/0P, 112A/115P   Knee mobilization                        Neuro Re-Ed        Patient Education         Quad sets     Prone knee flex into quad set off table 3x10 3lbs   Gait training w/ SPC        STS    2in under LLE holding 10lbs 2x10 2in under LLE holding 10lbs 2x10   bridges W/HS curl on pball 3x10    Bridge w/LE ext  W/HS curl on pball 2x10 W/HS curl on pball 15x    Bridges w/LE ext 2x10    Step down   8\" 2x10 up & down       Biodex        wobbleboard    2x40s ML/AP ea 2x30s ML/AP ea   Squats     3x10 on airex   lunges        TKE        bosu step ups 2x10 R       SLS airex 3x30s  airex 3x30s airex 3x30s    SAQ        LAQ        Heel raises off foam 15x 3 ways  3x10     SLR   2x10 R     Ther Ex        Heel slides        Seated HS str        Recumbent bike        Prone quad str     4x30s   Seated knee flexion str lean away/towards        SAQ     " Log rolling        SLR        Mini Squats        Hip adduction        Gastroc stretch        Heel Raises        Seated hip flexion         Step up & down        Prone hang         STS        Stool HS pulls   3 laps 20ft DL 1 lap 15ft SL  2 laps 15ft DL    Clinic ambulation        Knee flexion AAROM seated        Prone Hamstring curls        Leg Press SL 3x10 40lbs  SL 3x10 40lbs SL 3x10 40lbs SL 3x10 35lbs w/quad engagement

## 2023-06-23 ENCOUNTER — OFFICE VISIT (OUTPATIENT)
Dept: PHYSICAL THERAPY | Facility: CLINIC | Age: 58
End: 2023-06-23
Payer: COMMERCIAL

## 2023-06-23 DIAGNOSIS — M17.12 PRIMARY OSTEOARTHRITIS OF LEFT KNEE: ICD-10-CM

## 2023-06-23 DIAGNOSIS — M17.11 PRIMARY OSTEOARTHRITIS OF RIGHT KNEE: Primary | ICD-10-CM

## 2023-06-23 DIAGNOSIS — Z96.651 STATUS POST RIGHT KNEE REPLACEMENT: ICD-10-CM

## 2023-06-23 PROCEDURE — 97110 THERAPEUTIC EXERCISES: CPT

## 2023-06-23 PROCEDURE — 97112 NEUROMUSCULAR REEDUCATION: CPT

## 2023-06-23 NOTE — PROGRESS NOTES
"Daily Note     Today's date: 2023  Patient name: Franny Hussein  : 1965  MRN: [id number]  Referring provider: Socorro Dave DO  Dx:   Encounter Diagnosis     ICD-10-CM    1  Primary osteoarthritis of right knee  M17 11       2  Status post right knee replacement  Z96 651       3  Primary osteoarthritis of left knee  M17 12           Start Time: 0800  Stop Time: 0840  Total time in clinic (min): 40 minutes    Subjective: Pt reports that she is feeling ok on presentation  Objective: See treatment diary below      Assessment: Tolerated treatment well  Patient demonstrated fatigue post treatment, exhibited good technique with therapeutic exercises and would benefit from continued PT Focused on hamstring strength as patient struggled with HS curl bridges  Plan: Continue per plan of care  Progress treatment as tolerated         Precautions: Type 2 Diabetes, R TKA protocol   HEP ACCESS CODE: [id number]   IE Post-Op: 23  Surgical Date: 23      Date 6/23 6/19 6/15 6/12 6/9 6/5   Visit Number  26 25 24 23 22 21   Manuals         Knee PROM      -2A/0P, 112A/115P   Knee mobilization                           Neuro Re-Ed         Patient Education          Quad sets      Prone knee flex into quad set off table 3x10 3lbs   Gait training w/ SPC         STS     2in under LLE holding 10lbs 2x10 2in under LLE holding 10lbs 2x10   bridges W/HS curl on pball 3x10    Bridge w/LE ext 2x10 W/HS curl on pball 3x10    Bridge w/LE ext  W/HS curl on pball 2x10 W/HS curl on pball 15x    Bridges w/LE ext 2x10    Step down    8\" 2x10 up & down       Biodex         wobbleboard     2x40s ML/AP ea 2x30s ML/AP ea   Squats      3x10 on airex   lunges         TKE         bosu step ups 20 R 2x10 R       SLS airex 3x30s airex 3x30s  airex 3x30s airex 3x30s    SAQ         LAQ         Heel raises off foam 15x 3 ways 15x 3 ways  3x10     SLR    2x10 R     Ther Ex         Heel slides         Seated HS str Standing 10x10s B    " Recumbent bike         Prone quad str      4x30s   Seated knee flexion str lean away/towards         SAQ         Log rolling         SLR         Mini Squats         Hip adduction         Gastroc stretch         Heel Raises         Seated hip flexion          Step up & down         Prone hang          STS         Stool HS pulls 1 laps 50 ft SL  1 laps 50 ft DL   3 laps 20ft DL 1 lap 15ft SL  2 laps 15ft DL    Clinic ambulation         Knee flexion AAROM seated         Prone Hamstring curls         Leg Press SL 3x10 40lbs SL 3x10 40lbs  SL 3x10 40lbs SL 3x10 40lbs SL 3x10 35lbs w/quad engagement

## 2023-06-26 ENCOUNTER — OFFICE VISIT (OUTPATIENT)
Dept: PHYSICAL THERAPY | Facility: CLINIC | Age: 58
End: 2023-06-26
Payer: COMMERCIAL

## 2023-06-26 DIAGNOSIS — M17.11 PRIMARY OSTEOARTHRITIS OF RIGHT KNEE: Primary | ICD-10-CM

## 2023-06-26 DIAGNOSIS — Z96.651 STATUS POST RIGHT KNEE REPLACEMENT: ICD-10-CM

## 2023-06-26 DIAGNOSIS — M17.12 PRIMARY OSTEOARTHRITIS OF LEFT KNEE: ICD-10-CM

## 2023-06-26 PROCEDURE — 97110 THERAPEUTIC EXERCISES: CPT

## 2023-06-26 PROCEDURE — 97112 NEUROMUSCULAR REEDUCATION: CPT

## 2023-06-26 NOTE — PROGRESS NOTES
"Daily Note     Today's date: 2023  Patient name: Darleen Clinton  : 1965  MRN: [id number]  Referring provider: Violette Haines DO  Dx:   Encounter Diagnosis     ICD-10-CM    1  Primary osteoarthritis of right knee  M17 11       2  Status post right knee replacement  Z96 651       3  Primary osteoarthritis of left knee  M17 12           Start Time: 0800  Stop Time: 0848  Total time in clinic (min): 48 minutes    Subjective: Pt reports that her L knee is starting to bother her in the same spot the R knee was bothering her pre-surgery  No new complaints of the R knee  Pt denies R kne pain prior to the start of her treatment session  Objective: See treatment diary below      Assessment: Tolerated treatment well  Patient demonstrated fatigue post treatment, exhibited good technique with therapeutic exercises and would benefit from continued PT      Plan: Continue per plan of care  Progress treatment as tolerated         Precautions: Type 2 Diabetes, R TKA protocol   HEP ACCESS CODE: [id number]   IE Post-Op: 23  Surgical Date: 23      Date 6/26 6/23 6/19 6/15 6/12   Visit Number  27 26 25 24 23   Manuals        Knee PROM        Knee mobilization                        Neuro Re-Ed        Patient Education         Quad sets        Gait training w/ SPC        STS        bridges W/HS curl on pball 2x10    Bridge w/LE ext 2x10 W/HS curl on pball 3x10    Bridge w/LE ext 2x10 W/HS curl on pball 3x10    Bridge w/LE ext  W/HS curl on pball 2x10   Step down     8\" 2x10 up & down     Biodex        wobbleboard 2x30s ML & AP       Squats        lunges Walking lunge 2x25ft       TKE        bosu step ups  20 R 2x10 R     SLS airex 3x30s airex 3x30s airex 3x30s  airex 3x30s   SAQ        LAQ        Heel raises off foam  15x 3 ways 15x 3 ways  3x10   SLR     2x10 R   Ther Ex        Heel slides        Seated HS str Seated 3x30s Standing 10x10s B      Recumbent bike        Prone quad str 4x30s       Seated knee " flexion str lean away/towards        SAQ        Log rolling        SLR        Mini Squats        Hip adduction        Gastroc stretch        Heel Raises        Seated hip flexion         Step up & down        Prone hang         STS        Stool HS pulls  1 laps 50 ft SL  1 laps 50 ft DL   3 laps 20ft DL   Clinic ambulation        Knee flexion AAROM seated        Prone Hamstring curls        Leg Press SL 3x10 45lbs SL 3x10 40lbs SL 3x10 40lbs  SL 3x10 40lbs

## 2023-06-30 ENCOUNTER — OFFICE VISIT (OUTPATIENT)
Dept: PHYSICAL THERAPY | Facility: CLINIC | Age: 58
End: 2023-06-30
Payer: COMMERCIAL

## 2023-06-30 DIAGNOSIS — M17.11 PRIMARY OSTEOARTHRITIS OF RIGHT KNEE: Primary | ICD-10-CM

## 2023-06-30 DIAGNOSIS — M17.12 PRIMARY OSTEOARTHRITIS OF LEFT KNEE: ICD-10-CM

## 2023-06-30 DIAGNOSIS — Z96.651 STATUS POST RIGHT KNEE REPLACEMENT: ICD-10-CM

## 2023-06-30 PROCEDURE — 97112 NEUROMUSCULAR REEDUCATION: CPT

## 2023-06-30 NOTE — PROGRESS NOTES
Daily Note     Today's date: 2023  Patient name: Robert Wiseman  : 1965  MRN: [id number]  Referring provider: Rashmi Treadwell DO  Dx:   Encounter Diagnosis     ICD-10-CM    1  Primary osteoarthritis of right knee  M17 11       2  Status post right knee replacement  Z96 651       3  Primary osteoarthritis of left knee  M17 12           Start Time: 0800  Stop Time: 0845  Total time in clinic (min): 45 minutes    Subjective: Pt reports that her knee was bothering her a little last night  Pt denies pain this morning, states she is a little frustrated she doesn't have the bend enough in her knee to be able to sit on her heels  Objective: See treatment diary below      Assessment: Tolerated treatment well  Patient demonstrated fatigue post treatment, exhibited good technique with therapeutic exercises and would benefit from continued PT      Plan: Continue per plan of care  Progress treatment as tolerated         Precautions: Type 2 Diabetes, R TKA protocol   HEP ACCESS CODE: [id number]   IE Post-Op: 23  Surgical Date: 23      Date 6/30 6/26 6/23 6/19 6/15   Visit Number  28 27 26 25 24   Manuals        Knee PROM        Knee mobilization                        Neuro Re-Ed        Patient Education         Quad sets        Gait training w/ SPC        STS        bridges W/HS curl on pball 2x10    Bridge w/LE ext 2x10 W/HS curl on pball 2x10    Bridge w/LE ext 2x10 W/HS curl on pball 3x10    Bridge w/LE ext 2x10 W/HS curl on pball 3x10    Bridge w/LE ext    Step down        Biodex        wobbleboard  2x30s ML & AP      Squats On black disc 3x10       lunges Walking lunge w/tidal tank 2x20ft Walking lunge 2x25ft      TKE        bosu step ups   20 R 2x10 R    SLS W/trampoline ball toss 30x B    W/tidal tank twists 20x B airex 3x30s airex 3x30s airex 3x30s    SAQ        LAQ        Heel raises off foam   15x 3 ways 15x 3 ways    SLR        Ther Ex        Heel slides        Seated HS str  Seated 3x30s Standing 10x10s B     Recumbent bike        Prone quad str  4x30s      Seated knee flexion str lean away/towards        SAQ        Log rolling        SLR        Mini Squats        Hip adduction        Gastroc stretch        Heel Raises        Seated hip flexion         Step up & down        Prone hang         STS        Stool HS pulls   1 laps 50 ft SL  1 laps 50 ft DL     Clinic ambulation        Knee flexion AAROM seated        Prone Hamstring curls        Leg Press SL 3x10 45lbs SL 3x10 45lbs SL 3x10 40lbs SL 3x10 40lbs

## 2023-07-05 ENCOUNTER — OFFICE VISIT (OUTPATIENT)
Dept: PHYSICAL THERAPY | Facility: CLINIC | Age: 58
End: 2023-07-05
Payer: COMMERCIAL

## 2023-07-05 DIAGNOSIS — M17.11 PRIMARY OSTEOARTHRITIS OF RIGHT KNEE: Primary | ICD-10-CM

## 2023-07-05 DIAGNOSIS — M17.12 PRIMARY OSTEOARTHRITIS OF LEFT KNEE: ICD-10-CM

## 2023-07-05 DIAGNOSIS — Z96.651 STATUS POST RIGHT KNEE REPLACEMENT: ICD-10-CM

## 2023-07-05 PROCEDURE — 97110 THERAPEUTIC EXERCISES: CPT | Performed by: PHYSICAL THERAPIST

## 2023-07-05 PROCEDURE — 97112 NEUROMUSCULAR REEDUCATION: CPT | Performed by: PHYSICAL THERAPIST

## 2023-07-05 NOTE — PROGRESS NOTES
Daily Note     Today's date: 2023  Patient name: Amy Mcpherson  : 1965  MRN: [id number]  Referring provider: Belkis Leyva DO  Dx:   Encounter Diagnosis     ICD-10-CM    1. Primary osteoarthritis of right knee  M17.11       2. Status post right knee replacement  Z96.651       3. Primary osteoarthritis of left knee  M17.12           Start Time: 7665  Stop Time: 09  Total time in clinic (min): 43 minutes    Subjective: Cecelia reports that her knee was bothering her a little last night. She did not try to do any of her exercises to fix. Objective: See treatment diary below      Assessment: Tolerated treatment well. She demonstrated fatigue post treatment, exhibited good technique with therapeutic exercises and would benefit from continued PT      Plan: Continue per plan of care. Progress treatment as tolerated.        Precautions: Type 2 Diabetes, R TKA protocol   HEP ACCESS CODE: [id number]   IE Post-Op: 23  Surgical Date: 23      Date 7/5 6/30 6/26 6/23 6/19 6/15   Visit Number  29 28 27 26 25 24   Manuals         Knee PROM         Knee mobilization                           Neuro Re-Ed         Patient Education          Quad sets         Gait training w/ SPC         STS         bridges W/HS curl on pball 2x10    Bridge w/LE ext 2x10 W/HS curl on pball 2x10    Bridge w/LE ext 2x10 W/HS curl on pball 2x10    Bridge w/LE ext 2x10 W/HS curl on pball 3x10    Bridge w/LE ext 2x10 W/HS curl on pball 3x10    Bridge w/LE ext    Step down         Biodex         wobbleboard   2x30s ML & AP      Squats On black disc 3x10 On black disc 3x10       lunges Walking lunge w/tidal tank 2x20ft Walking lunge w/tidal tank 2x20ft Walking lunge 2x25ft      TKE         bosu step ups    20 R 2x10 R    SLS W/trampoline ball toss 30x B    W/tidal tank twists 20x B W/trampoline ball toss 30x B    W/tidal tank twists 20x B airex 3x30s airex 3x30s airex 3x30s    SAQ         LAQ         Heel raises off foam    15x 3 ways 15x 3 ways    SLR         Ther Ex         Heel slides         Seated HS str Seated 3x30s RLE  Seated 3x30s Standing 10x10s B     Recumbent bike         Prone quad str   4x30s      Seated knee flexion str lean away/towards         SAQ         Log rolling         SLR         Mini Squats         Hip adduction         Gastroc stretch         Heel Raises         Seated hip flexion  Yoga modifications with pillow underneath knees        Step up & down         Prone hang          STS         Stool HS pulls    1 laps 50 ft SL  1 laps 50 ft DL     Clinic ambulation  30 feet x 8        Knee flexion AAROM seated         Prone Hamstring curls         Leg Press  SL 3x10 45lbs SL 3x10 45lbs SL 3x10 40lbs SL 3x10 40lbs

## 2023-07-07 ENCOUNTER — OFFICE VISIT (OUTPATIENT)
Dept: PHYSICAL THERAPY | Facility: CLINIC | Age: 58
End: 2023-07-07
Payer: COMMERCIAL

## 2023-07-07 DIAGNOSIS — Z96.651 STATUS POST RIGHT KNEE REPLACEMENT: ICD-10-CM

## 2023-07-07 DIAGNOSIS — M17.11 PRIMARY OSTEOARTHRITIS OF RIGHT KNEE: Primary | ICD-10-CM

## 2023-07-07 DIAGNOSIS — M17.12 PRIMARY OSTEOARTHRITIS OF LEFT KNEE: ICD-10-CM

## 2023-07-07 PROCEDURE — 97110 THERAPEUTIC EXERCISES: CPT

## 2023-07-07 PROCEDURE — 97112 NEUROMUSCULAR REEDUCATION: CPT

## 2023-07-07 NOTE — PROGRESS NOTES
Daily Note     Today's date: 2023  Patient name: Tani Brooke  : 1965  MRN: [id number]  Referring provider: Patricia Galvan DO  Dx:   Encounter Diagnosis     ICD-10-CM    1. Primary osteoarthritis of right knee  M17.11       2. Status post right knee replacement  Z96.651       3. Primary osteoarthritis of left knee  M17.12           Start Time: 08  Stop Time: 845  Total time in clinic (min): 43 minutes    Subjective: Pt reports that she was very happy she was able to get into her yoga pose the last session, but was unable to do so at home. She thinks she needed to add more pillows behind her for support. She denies any knee pain prior to the start of her treatment session, continued stiffness. Objective: See treatment diary below      Assessment: Tolerated treatment well. Patient demonstrated fatigue post treatment, exhibited good technique with therapeutic exercises and would benefit from continued PT      Plan: Continue per plan of care. Progress treatment as tolerated.        Precautions: Type 2 Diabetes, R TKA protocol   HEP ACCESS CODE: [id number]   IE Post-Op: 23  Surgical Date: 23      Date    Visit Number  30 29 28 27 26   Manuals        Knee PROM        Knee mobilization                        Neuro Re-Ed        Patient Education         Quad sets        Gait training w/ SPC        STS        TRX single leg squats 2x10       bridges W/HS curl on pball 3x10    Bridge w/LE ext 2x10 W/HS curl on pball 2x10    Bridge w/LE ext 2x10 W/HS curl on pball 2x10    Bridge w/LE ext 2x10 W/HS curl on pball 2x10    Bridge w/LE ext 2x10 W/HS curl on pball 3x10    Bridge w/LE ext 2x10   Step down        Biodex        wobbleboard    2x30s ML & AP    Squats On black disc 3x10 On black disc 3x10 On black disc 3x10     lunges Walking lunge w/tidal tank 3x20ft Walking lunge w/tidal tank 2x20ft Walking lunge w/tidal tank 2x20ft Walking lunge 2x25ft    TKE        bosu step ups 20 R   SLS W/tidal tank holds 10x B as long as can hold    W/trampoline ball toss 20x 3 waysds W/trampoline ball toss 30x B    W/tidal tank twists 20x B W/trampoline ball toss 30x B    W/tidal tank twists 20x B airex 3x30s airex 3x30s   SAQ        LAQ        Heel raises off foam     15x 3 ways   SLR        Ther Ex        Heel slides        Seated HS str  Seated 3x30s RLE  Seated 3x30s Standing 10x10s B   Recumbent bike        Prone quad str 3x30s   4x30s    Seated knee flexion str lean away/towards        SAQ        Log rolling        SLR        Mini Squats        Hip adduction        Gastroc stretch        Heel Raises        Seated hip flexion  Yoga heel sitting 3x30s Yoga modifications with pillow underneath knees      Step up & down        Prone hang         STS        Stool HS pulls     1 laps 50 ft SL  1 laps 50 ft DL   Clinic ambulation   30 feet x 8      Knee flexion AAROM seated        Prone Hamstring curls        Leg Press   SL 3x10 45lbs SL 3x10 45lbs SL 3x10 40lbs

## 2023-07-12 ENCOUNTER — OFFICE VISIT (OUTPATIENT)
Dept: PHYSICAL THERAPY | Facility: CLINIC | Age: 58
End: 2023-07-12
Payer: COMMERCIAL

## 2023-07-12 DIAGNOSIS — M17.12 PRIMARY OSTEOARTHRITIS OF LEFT KNEE: ICD-10-CM

## 2023-07-12 DIAGNOSIS — M17.11 PRIMARY OSTEOARTHRITIS OF RIGHT KNEE: Primary | ICD-10-CM

## 2023-07-12 DIAGNOSIS — Z96.651 STATUS POST RIGHT KNEE REPLACEMENT: ICD-10-CM

## 2023-07-12 PROCEDURE — 97110 THERAPEUTIC EXERCISES: CPT | Performed by: PHYSICAL THERAPIST

## 2023-07-12 PROCEDURE — 97112 NEUROMUSCULAR REEDUCATION: CPT | Performed by: PHYSICAL THERAPIST

## 2023-07-12 NOTE — PROGRESS NOTES
Daily Note     Today's date: 2023  Patient name: Kavita Howell  : 1965  MRN: [id number]  Referring provider: Angel Christian DO  Dx:   Encounter Diagnosis     ICD-10-CM    1. Primary osteoarthritis of right knee  M17.11       2. Status post right knee replacement  Z96.651       3. Primary osteoarthritis of left knee  M17.12           Start Time: 801  Stop Time: 845  Total time in clinic (min): 44 minutes    Subjective: Client arrives with reports of feeling sore after being at the beach this weekend and having to walk on sand. Objective: See treatment diary below      Assessment: Tolerated treatment well. She demonstrated fatigue post treatment, exhibited good technique with therapeutic exercises and would benefit from continued PT      Plan: Continue per plan of care. Progress treatment as tolerated. Precautions: Type 2 Diabetes, R TKA protocol   HEP ACCESS CODE: [id number]   IE Post-Op: 23  Surgical Date: 23      Date    Visit Number  31 30 29 28 27 26   Manuals         Knee PROM         Knee mobilization                           Neuro Re-Ed         Patient Education          Quad sets         Gait training w/ SPC         STS         TRX single leg squats BLE deep squats 2x15.     SL TRX 2x15  2x10       bridges W/ HS curl 2x15    Bridge with LE ext 2x15 W/HS curl on pball 3x10    Bridge w/LE ext 2x10 W/HS curl on pball 2x10    Bridge w/LE ext 2x10 W/HS curl on pball 2x10    Bridge w/LE ext 2x10 W/HS curl on pball 2x10    Bridge w/LE ext 2x10 W/HS curl on pball 3x10    Bridge w/LE ext 2x10   Step down         Biodex         wobbleboard     2x30s ML & AP    Squats On black discs 2x15 On black disc 3x10 On black disc 3x10 On black disc 3x10     lunges  Walking lunge w/tidal tank 3x20ft Walking lunge w/tidal tank 2x20ft Walking lunge w/tidal tank 2x20ft Walking lunge 2x25ft    TKE         bosu step ups      20 R   SLS On airex pad 10x 15 sec holds W/tidal tank holds 10x B as long as can hold    W/trampoline ball toss 20x 3 waysds W/trampoline ball toss 30x B    W/tidal tank twists 20x B W/trampoline ball toss 30x B    W/tidal tank twists 20x B airex 3x30s airex 3x30s   SAQ         LAQ         Heel raises off foam      15x 3 ways   SLR         Ther Ex         Heel slides         Seated HS str   Seated 3x30s RLE  Seated 3x30s Standing 10x10s B   Recumbent bike         Prone quad str In yoga position 2x30 sec 3x30s   4x30s    Seated knee flexion str lean away/towards         SAQ         Log rolling         SLR         Mini Squats         Hip adduction         Gastroc stretch         Heel Raises         Seated hip flexion  Yoga modifications with pillows underneath knees and BUE on chair 30x Yoga heel sitting 3x30s Yoga modifications with pillow underneath knees      Step up & down         Prone hang          STS         Stool HS pulls      1 laps 50 ft SL  1 laps 50 ft DL   Clinic ambulation    30 feet x 8      Knee flexion AAROM seated         Prone Hamstring curls Standing 2x15        Leg Press Complete next session   SL 3x10 45lbs SL 3x10 45lbs SL 3x10 40lbs

## 2023-07-14 ENCOUNTER — OFFICE VISIT (OUTPATIENT)
Dept: PHYSICAL THERAPY | Facility: CLINIC | Age: 58
End: 2023-07-14
Payer: COMMERCIAL

## 2023-07-14 DIAGNOSIS — M17.11 PRIMARY OSTEOARTHRITIS OF RIGHT KNEE: Primary | ICD-10-CM

## 2023-07-14 DIAGNOSIS — Z96.651 STATUS POST RIGHT KNEE REPLACEMENT: ICD-10-CM

## 2023-07-14 DIAGNOSIS — M17.12 PRIMARY OSTEOARTHRITIS OF LEFT KNEE: ICD-10-CM

## 2023-07-14 PROCEDURE — 97112 NEUROMUSCULAR REEDUCATION: CPT | Performed by: PHYSICAL THERAPIST

## 2023-07-14 PROCEDURE — 97110 THERAPEUTIC EXERCISES: CPT | Performed by: PHYSICAL THERAPIST

## 2023-07-14 NOTE — PROGRESS NOTES
Daily Note     Today's date: 2023  Patient name: Nomi   : 1965  MRN: [id number]  Referring provider: Cesilia Roberson DO  Dx:   Encounter Diagnosis     ICD-10-CM    1. Primary osteoarthritis of right knee  M17.11       2. Status post right knee replacement  Z96.651       3. Primary osteoarthritis of left knee  M17.12                      Subjective: Patient reports she continues to feel mild pain on the lateral portion of her knee. Objective: See treatment diary below      Assessment: Tolerated treatment well. Patient continues to have difficulty with quadriceps tightness, emphasized flexibility exercises to address lateral distal quadriceps pain/tightness. She continues to demonstrate strength improvement overall. Patient demonstrated fatigue post treatment, exhibited good technique with therapeutic exercises and would benefit from continued PT      Plan: Continue per plan of care. Progress treatment as tolerated. Progress challenge as appropriate with irritability. Precautions: Type 2 Diabetes, R TKA protocol   HEP ACCESS CODE: [id number]   IE Post-Op: 23  Surgical Date: 23      Date    Visit Number  32 31 30 29 28 27   Manuals         Knee PROM         Knee mobilization                           Neuro Re-Ed         Patient Education          Quad sets         Gait training w/ SPC         STS         TRX single leg squats BLE deep squats 2x15. SL TRX 2x15 BLE deep squats 2x15.     SL TRX 2x15  2x10      bridges W/ HS curl 2x15 W/ HS curl 2x15    Bridge with LE ext 2x15 W/HS curl on pball 3x10    Bridge w/LE ext 2x10 W/HS curl on pball 2x10    Bridge w/LE ext 2x10 W/HS curl on pball 2x10    Bridge w/LE ext 2x10 W/HS curl on pball 2x10    Bridge w/LE ext 2x10   Step down         Biodex         wobbleboard      2x30s ML & AP   Squats On black discs 2x15 On black discs 2x15 On black disc 3x10 On black disc 3x10 On black disc 3x10    lunges   Walking lunge w/tidal tank 3x20ft Walking lunge w/tidal tank 2x20ft Walking lunge w/tidal tank 2x20ft Walking lunge 2x25ft   TKE         bosu step ups         SLS W/ tidal tank 10x10" On airex pad 10x 15 sec holds W/tidal tank holds 10x B as long as can hold    W/trampoline ball toss 20x 3 waysds W/trampoline ball toss 30x B    W/tidal tank twists 20x B W/trampoline ball toss 30x B    W/tidal tank twists 20x B airex 3x30s   SAQ         LAQ         Heel raises off foam         SLR         Ther Ex         Heel slides         Seated HS str    Seated 3x30s RLE  Seated 3x30s   Recumbent bike         Prone quad str In yoga position 2x10 10" In yoga position 2x30 sec 3x30s   4x30s   Seated knee flexion str lean away/towards         SAQ         Log rolling         SLR         Mini Squats         Hip adduction         Gastroc stretch         Heel Raises         Seated hip flexion  Yoga pillows under knees and using swiss ball 10x10" Yoga modifications with pillows underneath knees and BUE on chair 30x Yoga heel sitting 3x30s Yoga modifications with pillow underneath knees     Step up & down         Prone hang          STS         Stool HS pulls         Clinic ambulation     30 feet x 8     Knee flexion AAROM seated         Prone Hamstring curls W/ OP 2x10 5s Standing 2x15       Leg Press SL 60# 2x10 Complete next session   SL 3x10 45lbs SL 3x10 45lbs

## 2023-07-19 ENCOUNTER — EVALUATION (OUTPATIENT)
Dept: PHYSICAL THERAPY | Facility: CLINIC | Age: 58
End: 2023-07-19
Payer: COMMERCIAL

## 2023-07-19 DIAGNOSIS — M17.11 PRIMARY OSTEOARTHRITIS OF RIGHT KNEE: Primary | ICD-10-CM

## 2023-07-19 DIAGNOSIS — Z96.651 STATUS POST RIGHT KNEE REPLACEMENT: ICD-10-CM

## 2023-07-19 DIAGNOSIS — M17.12 PRIMARY OSTEOARTHRITIS OF LEFT KNEE: ICD-10-CM

## 2023-07-19 PROCEDURE — 97110 THERAPEUTIC EXERCISES: CPT

## 2023-07-19 NOTE — PROGRESS NOTES
PT Re-Evaluation     Today's date: 2023  Patient name: Swapnil Ardon  : 1965  MRN: [id number]  Referring provider: Sin Lopez DO  Dx:   Encounter Diagnosis     ICD-10-CM    1. Primary osteoarthritis of right knee  M17.11       2. Status post right knee replacement  Z96.651       3. Primary osteoarthritis of left knee  M17.12           Start Time: 08  Stop Time: 0848  Total time in clinic (min): 43 minutes    Assessment  Assessment details: Patient, Swapnil Ardon, arrives to outpatient physical therapy for her 32nd visit with primary complaints of acute knee pain following a R TKA on 23. Cecelia demonstrates continued objective improvements in R knee flexion and extension A/PROM testing, however pain and restrictions noted with flexion. Pt demonstrated improved MMT of knee extension, however deficits with flexion persist as well as hip flexion persist.  Additionally, she demonstrated continued improvements with functional test scores, demonstrating improved activity tolerance and ambulation tolerance around the home. She is able to demonstrate a normalized gait pattern without any compensations. No long has difficulty or problems with stair navigation. Endurance with ambulating long distances, sitting/standing for long periods of time is a work in progress. These limitations impact Cecelia to participate in self-care, ADL's, and recreational activities without compensations. Cecelia would likely benefit from continued skilled physical therapy to address their aforementioned impairments and limitations in order to return to prior level of functioning and maximize independent mobility with anticipated discharge at the end of the month.      Impairments: abnormal coordination, abnormal gait, abnormal or restricted ROM, abnormal movement, activity intolerance, impaired balance, impaired physical strength, pain with function, safety issue, weight-bearing intolerance and poor body mechanics    Symptom irritability: lowUnderstanding of Dx/Px/POC: good   Prognosis: good   Goals  STG (2-6 weeks)  1. Patient will be independent with comprehensive HEP. -met  2. Patient will demonstrate an increase in active knee ROM to WNL in order to improve gait. -met  3. Patient will improve knee MMT by a score of 1 in order to perform ADL's.-met    LTG (6-12 weeks)  1. Patient will be able to work a full shift with less than 3 out of 10 pain. -progressing  2. Patient will ascend and descend full flight of stairs reciprocally and without increase in pain in order to complete ADL's. -Met  3. Patient will demonstrate full functional squat with proper knee mechanics with no reports of pain. - Met  4. Patient will return to their previous exercise level using corrected mechanics without increase in pain. -progressing     Plan  Patient would benefit from: skilled physical therapy and PT eval  Planned modality interventions: cryotherapy, TENS and thermotherapy: hydrocollator packs  Planned therapy interventions: ADL retraining, abdominal trunk stabilization, activity modification, IADL retraining, joint mobilization, manual therapy, massage, motor coordination training, muscle pump exercises, neuromuscular re-education, patient education, ADL training, balance, balance/weight bearing training, body mechanics training, postural training, self care, coordination, strengthening, stretching, therapeutic activities, therapeutic exercise, flexibility, fine motor coordination training, gait training, graded activity, functional ROM exercises, graded exercise, graded motor, transfer training, therapeutic training and home exercise program  Frequency: 1-2x/week.   Duration in weeks: 3  Treatment plan discussed with: patient         Subjective Evaluation     History of Present Illness  Date of surgery: 4/11/2023  Mechanism of injury: surgery  Mechanism of injury: Post Op 4/14/23: Pt states that she has been swimming the past few days, her knee was bothering her more recently. Pt notices the bending has gotten better but crossing her legs is still difficult. Kneeling is also getting better but is not where she wants it to be. No problem going up and down steps. Pt has not been back to the gym, but it is part of her plan. Pt states walking about 10 mins/1 mile and noticed pain on the lateral aspect of the knee.                Recurrent probem    Quality of life: good    Pain  Current pain ratin  At best pain ratin  At worst pain ratin (bending her knee)  Quality: burning, sharp, tight, discomfort and squeezing  Relieving factors: rest, relaxation and ice  Aggravating factors: standing, stair climbing, running and sitting  Progression: worsening     Social Support  Steps to enter house: yes  2  17  Lives in: multiple-level home  Lives with: alone     Employment status: not working  Exercise history: Likes to go on [employer]       Diagnostic Tests  X-ray: abnormal  Treatments  Previous treatment: physical therapy and injection treatment  Patient Goals  Patient goals for therapy: decreased pain, increased strength, independence with ADLs/IADLs, return to sport/leisure activities, increased motion and improved balance              Objective      Tenderness   Left Knee   No tenderness in the lateral joint line, LCL (distal), LCL (proximal), MCL (distal), MCL (proximal) and medial joint line.      Right Knee   Tenderness in the medial and lateral joint line, medial and lateral aspects of patella     Active Range of Motion   Left Knee   Flexion: 132 degrees   Extension: 0 degrees      Right Knee   Flexion: 118 degrees with pain  Extension: -2 degrees with pain     Passive Range of Motion   Left Knee   Flexion: 138 degrees      Right Knee   Flexion: 122 degrees with pain  Extension: 0 degrees with pain     Mobility   Patellar Mobility:   Left Knee   WFL: medial and lateral.   Hypomobile: left superior and left inferior     Patellar Static Positioning   Left Knee: goldie  Right Knee: goldie     Strength/Myotome Testing      Left Hip   Planes of Motion   Flexion: 4+  Abduction: 4+  Adduction: 4+     Right Hip   Planes of Motion   Flexion: 3+  Abduction: 4+  Adduction: 4+     Left Knee   Flexion: 4+  Extension: 4+  Quadriceps contraction: good     Right Knee   Flexion: 4-   Extension: 4+  Quadriceps contraction: good     Ambulation   Weight-Bearing Status   Weight-Bearing Status (Left): full weight bearing   Weight-Bearing Status (Right): full weight-bearing    Distance in feet: 100  Assistive device used: none     Ambulation: Level Surfaces   Ambulation with assistive device: independent  Ambulation without assistive device: independent     Observational Gait   Stride length within functional limits. Normalized stance and swing time b/l. Left foot contact pattern: heel contact  Right foot contact pattern: heel contact  Left arm swing: within functional limits  Right arm swing: within functional limits  Base of support: normal  Left stance time comments: WFL  Left swing time comments: WFL  Right stance time comments: WFL  Right swing time comments: [facility]     Functional Assessment        Squat    tightness and discomfort noted.  Good mechanics     Forward Step Up 6"   Left Leg  WFL     Right Leg  WFL     Forward Step Up 8"   Left Leg  WFL     Forward Step Down 6"   Left Leg  Contralateral toe touch     Right Leg  Contralateral toe touch     Forward Step Down 8"   Left Leg  Contralateral toe touch     Right Leg  Contralateral toe touch     Comments    5 STS: 12.2s   TU.57s   6MWT: 1,150ft    SLS:  L: 25s  R: 27s        Precautions: Type 2 Diabetes, R TKA protocol   HEP ACCESS CODE: [id number]   IE Post-Op: 23  Surgical Date: 23      Date    Visit Number  33 32 31 30 29   Manuals        Knee PROM        Knee mobilization                        Neuro Re-Ed        Patient Education         Quad sets        Gait training w/ SPC        STS        TRX single leg squats  BLE deep squats 2x15. SL TRX 2x15 BLE deep squats 2x15.     SL TRX 2x15  2x10    bridges  W/ HS curl 2x15 W/ HS curl 2x15    Bridge with LE ext 2x15 W/HS curl on pball 3x10    Bridge w/LE ext 2x10 W/HS curl on pball 2x10    Bridge w/LE ext 2x10   Step down        Biodex        wobbleboard        Squats  On black discs 2x15 On black discs 2x15 On black disc 3x10 On black disc 3x10   lunges    Walking lunge w/tidal tank 3x20ft Walking lunge w/tidal tank 2x20ft   TKE        bosu step ups        SLS  W/ tidal tank 10x10" On airex pad 10x 15 sec holds W/tidal tank holds 10x B as long as can hold    W/trampoline ball toss 20x 3 waysds W/trampoline ball toss 30x B    W/tidal tank twists 20x B   SAQ        LAQ        Heel raises off foam        SLR        Ther Ex        Heel slides        Seated HS str     Seated 3x30s RLE   Recumbent bike        Prone quad str  In yoga position 2x10 10" In yoga position 2x30 sec 3x30s    Seated knee flexion str lean away/towards        SAQ        Log rolling        SLR        Mini Squats        Hip adduction        Gastroc stretch        Heel Raises        Seated hip flexion   Yoga pillows under knees and using swiss ball 10x10" Yoga modifications with pillows underneath knees and BUE on chair 30x Yoga heel sitting 3x30s Yoga modifications with pillow underneath knees   Step up & down        Prone hang         STS        Stool HS pulls        Clinic ambulation      30 feet x 8   Knee flexion AAROM seated        Prone Hamstring curls  W/ OP 2x10 5s Standing 2x15     Leg Press  SL 60# 2x10 Complete next session

## 2023-07-21 ENCOUNTER — OFFICE VISIT (OUTPATIENT)
Dept: PHYSICAL THERAPY | Facility: CLINIC | Age: 58
End: 2023-07-21
Payer: COMMERCIAL

## 2023-07-21 DIAGNOSIS — Z96.651 STATUS POST RIGHT KNEE REPLACEMENT: ICD-10-CM

## 2023-07-21 DIAGNOSIS — M17.12 PRIMARY OSTEOARTHRITIS OF LEFT KNEE: ICD-10-CM

## 2023-07-21 DIAGNOSIS — M17.11 PRIMARY OSTEOARTHRITIS OF RIGHT KNEE: Primary | ICD-10-CM

## 2023-07-21 PROCEDURE — 97110 THERAPEUTIC EXERCISES: CPT

## 2023-07-21 PROCEDURE — 97112 NEUROMUSCULAR REEDUCATION: CPT

## 2023-07-21 NOTE — PROGRESS NOTES
Daily Note     Today's date: 2023  Patient name: Alvarado Douglas  : 1965  MRN: [id number]  Referring provider: Kaur Quinonez DO  Dx:   Encounter Diagnosis     ICD-10-CM    1. Primary osteoarthritis of right knee  M17.11       2. Status post right knee replacement  Z96.651       3. Primary osteoarthritis of left knee  M17.12           Start Time: 08  Stop Time: 0845  Total time in clinic (min): 42 minutes    Subjective: Pt reports that her knee is doing well, continues with her HEP and kneeling stretches. No new complaints. Objective: See treatment diary below      Assessment: Tolerated treatment well. Patient demonstrated fatigue post treatment, exhibited good technique with therapeutic exercises and would benefit from continued PT. Pt wants to work on her hip flexion and ER next session to progress her leg crossing. Plan: Continue per plan of care. Progress treatment as tolerated. Precautions: Type 2 Diabetes, R TKA protocol   HEP ACCESS CODE: [id number]   IE Post-Op: 23  Surgical Date: 23      Date    Visit Number  34 33 32 31 30   Manuals        Knee PROM        Knee mobilization                        Neuro Re-Ed        Patient Education         Quad sets        Gait training w/ SPC        STS        TRX single leg squats   BLE deep squats 2x15. SL TRX 2x15 BLE deep squats 2x15.     SL TRX 2x15  2x10   bridges   W/ HS curl 2x15 W/ HS curl 2x15    Bridge with LE ext 2x15 W/HS curl on pball 3x10    Bridge w/LE ext 2x10   Step down        Biodex        wobbleboard        Squats On black discs 2x10  On black discs 2x15 On black discs 2x15 On black disc 3x10   lunges Walking lunge w/tidal tanks 3x20ft    Walking lunge w/tidal tank 3x20ft   TKE        bosu step ups        SLS W/tidal tank 6x10"  W/ tidal tank 10x10" On airex pad 10x 15 sec holds W/tidal tank holds 10x B as long as can hold    W/trampoline ball toss 20x 3 waysds   SAQ        LAQ Heel raises off foam        SLR        Ther Ex        Heel slides        Seated HS str 2x30s B       Recumbent bike        Prone quad str In yoga position 2x10, 5"  In yoga position 2x10 10" In yoga position 2x30 sec 3x30s   Seated knee flexion str lean away/towards        SAQ        Log rolling        SLR        Mini Squats        Hip adduction        Gastroc stretch        Heel Raises        Seated hip flexion    Yoga pillows under knees and using swiss ball 10x10" Yoga modifications with pillows underneath knees and BUE on chair 30x Yoga heel sitting 3x30s   Step up & down        Prone hang         STS        Stool HS pulls 3 laps 10ft       Clinic ambulation        Knee flexion AAROM seated        Prone Hamstring curls W/OP 2x10 5s  W/ OP 2x10 5s Standing 2x15    Leg Press   SL 60# 2x10 Complete next session

## 2023-07-26 ENCOUNTER — OFFICE VISIT (OUTPATIENT)
Dept: PHYSICAL THERAPY | Facility: CLINIC | Age: 58
End: 2023-07-26
Payer: COMMERCIAL

## 2023-07-26 DIAGNOSIS — Z96.651 STATUS POST RIGHT KNEE REPLACEMENT: ICD-10-CM

## 2023-07-26 DIAGNOSIS — M17.11 PRIMARY OSTEOARTHRITIS OF RIGHT KNEE: Primary | ICD-10-CM

## 2023-07-26 DIAGNOSIS — M17.12 PRIMARY OSTEOARTHRITIS OF LEFT KNEE: ICD-10-CM

## 2023-07-26 PROCEDURE — 97110 THERAPEUTIC EXERCISES: CPT

## 2023-07-26 PROCEDURE — 97112 NEUROMUSCULAR REEDUCATION: CPT

## 2023-07-26 NOTE — PROGRESS NOTES
Daily Note     Today's date: 2023  Patient name: Omega Ball  : 1965  MRN: [id number]  Referring provider: Pat Wood DO  Dx:   Encounter Diagnosis     ICD-10-CM    1. Primary osteoarthritis of right knee  M17.11       2. Status post right knee replacement  Z96.651       3. Primary osteoarthritis of left knee  M17.12           Start Time: 0810  Stop Time: 1001  Total time in clinic (min): 45 minutes    Subjective: Pt reports that she will occasionally get some clicking in her R knee but it is not painful. She did get some medial R knee pain yesterday and wasn't sure why. Pt is experiencing more L knee pain with her activities and stretches, wants to address it at her next follow-up with Dr. Nitish Lynch. Objective: See treatment diary below      Assessment: Tolerated treatment well. Patient demonstrated fatigue post treatment, exhibited good technique with therapeutic exercises and would benefit from continued PT. Pt was given hip flexion/ER/IR strengthening to work on her ability to cross her legs. Potential discharge at her next visit. Plan: Continue per plan of care. Potential discharge next visit. Progress treatment as tolerated. Precautions: Type 2 Diabetes, R TKA protocol   HEP ACCESS CODE: [id number]   IE Post-Op: 23  Surgical Date: 23      Date    Visit Number  [phone]   Manuals        Knee PROM        Knee mobilization                        Neuro Re-Ed        Patient Education         Quad sets        Gait training w/ SPC        STS        TRX single leg squats    BLE deep squats 2x15. SL TRX 2x15 BLE deep squats 2x15.     SL TRX 2x15    bridges    W/ HS curl 2x15 W/ HS curl 2x15    Bridge with LE ext 2x15   Step down        Biodex        wobbleboard        Squats  On black discs 2x10  On black discs 2x15 On black discs 2x15   lunges  Walking lunge w/tidal tanks 3x20ft      TKE        bosu step ups        SLS  W/tidal tank 6x10"  W/ tidal tank 10x10" On airex pad 10x 15 sec holds   SAQ        LAQ        Heel raises off foam        clamshells 2x10 B       Reverse clams 2x10 B       SLR 2lbs 2x10 R       Ther Ex        Heel slides        Seated HS str  2x30s B      Recumbent bike        Prone quad str  In yoga position 2x10, 5"  In yoga position 2x10 10" In yoga position 2x30 sec   Piriformis str 2x30s       TEX str 2x30s       Supine hip flex YTB 2x10 B       Seated knee flexion str lean away/towards        SAQ        Log rolling        SLR        Mini Squats        Hip adduction        Gastroc stretch        Heel Raises        Seated hip flexion     Yoga pillows under knees and using swiss ball 10x10" Yoga modifications with pillows underneath knees and BUE on chair 30x   Step up & down        Prone hang         STS        Stool HS pulls  3 laps 10ft      Clinic ambulation        Knee flexion AAROM seated        Prone Hamstring curls  W/OP 2x10 5s  W/ OP 2x10 5s Standing 2x15   Leg Press    SL 60# 2x10 Complete next session   Standing on/off horse 15x B       Seated hip ER & IR 2lbs 2x10 ea AROM  Hip flex & ER AAROM into TEX position w/SOS 15x

## 2023-07-28 ENCOUNTER — APPOINTMENT (OUTPATIENT)
Dept: RADIOLOGY | Facility: CLINIC | Age: 58
End: 2023-07-28
Payer: COMMERCIAL

## 2023-07-28 ENCOUNTER — OFFICE VISIT (OUTPATIENT)
Dept: OBGYN CLINIC | Facility: CLINIC | Age: 58
End: 2023-07-28
Payer: COMMERCIAL

## 2023-07-28 ENCOUNTER — OFFICE VISIT (OUTPATIENT)
Dept: PHYSICAL THERAPY | Facility: CLINIC | Age: 58
End: 2023-07-28
Payer: COMMERCIAL

## 2023-07-28 VITALS
HEIGHT: 61 IN | WEIGHT: 176 LBS | BODY MASS INDEX: 33.23 KG/M2 | DIASTOLIC BLOOD PRESSURE: 78 MMHG | SYSTOLIC BLOOD PRESSURE: 128 MMHG | HEART RATE: 60 BPM

## 2023-07-28 DIAGNOSIS — Z96.651 S/P TOTAL KNEE REPLACEMENT NOT USING CEMENT, RIGHT: ICD-10-CM

## 2023-07-28 DIAGNOSIS — M17.12 PRIMARY OSTEOARTHRITIS OF LEFT KNEE: ICD-10-CM

## 2023-07-28 DIAGNOSIS — M17.11 PRIMARY OSTEOARTHRITIS OF RIGHT KNEE: Primary | ICD-10-CM

## 2023-07-28 DIAGNOSIS — E11.9 TYPE 2 DIABETES MELLITUS WITHOUT COMPLICATION, UNSPECIFIED WHETHER LONG TERM INSULIN USE (HCC): ICD-10-CM

## 2023-07-28 DIAGNOSIS — G89.29 CHRONIC PAIN OF LEFT KNEE: ICD-10-CM

## 2023-07-28 DIAGNOSIS — M25.562 CHRONIC PAIN OF LEFT KNEE: ICD-10-CM

## 2023-07-28 DIAGNOSIS — Z96.651 AFTERCARE FOLLOWING RIGHT KNEE JOINT REPLACEMENT SURGERY: ICD-10-CM

## 2023-07-28 DIAGNOSIS — Z47.1 AFTERCARE FOLLOWING RIGHT KNEE JOINT REPLACEMENT SURGERY: ICD-10-CM

## 2023-07-28 DIAGNOSIS — M25.562 LEFT KNEE PAIN, UNSPECIFIED CHRONICITY: ICD-10-CM

## 2023-07-28 DIAGNOSIS — Z96.651 STATUS POST RIGHT KNEE REPLACEMENT: ICD-10-CM

## 2023-07-28 DIAGNOSIS — M17.12 PRIMARY OSTEOARTHRITIS OF LEFT KNEE: Primary | ICD-10-CM

## 2023-07-28 PROCEDURE — 99214 OFFICE O/P EST MOD 30 MIN: CPT | Performed by: ORTHOPAEDIC SURGERY

## 2023-07-28 PROCEDURE — 73562 X-RAY EXAM OF KNEE 3: CPT

## 2023-07-28 PROCEDURE — 97112 NEUROMUSCULAR REEDUCATION: CPT | Performed by: PHYSICAL THERAPIST

## 2023-07-28 PROCEDURE — 97110 THERAPEUTIC EXERCISES: CPT | Performed by: PHYSICAL THERAPIST

## 2023-07-28 NOTE — PATIENT INSTRUCTIONS
You will need antibiotics before any dental appointments for the rest of your life.  Please call our office and we can prescribe 2000mg of amoxicillin to take 1 hour prior to your appointment

## 2023-07-28 NOTE — PROGRESS NOTES
Discharge    Today's date: 2023  Patient name: Joann Bravo  : 1965  MRN: [id number]  Referring provider: Erika Carter DO  Dx:   Encounter Diagnosis     ICD-10-CM    1. Primary osteoarthritis of right knee  M17.11       2. Status post right knee replacement  Z96.651       3. Primary osteoarthritis of left knee  M17.12           Start Time: 801  Stop Time: 06  Total time in clinic (min): 46 minutes    Subjective: Patient reports hip and knee feeling good with some soreness since last session. Objective: See treatment diary below    Goals  STG (2-6 weeks)  1. Patient will be independent with comprehensive HEP. -met  2. Patient will demonstrate an increase in active knee ROM to WNL in order to improve gait. -met  3. Patient will improve knee MMT by a score of 1 in order to perform ADL's.-met    LTG (6-12 weeks)  1. Patient will be able to work a full shift with less than 3 out of 10 pain. -progressing  2. Patient will ascend and descend full flight of stairs reciprocally and without increase in pain in order to complete ADL's. -Met  3. Patient will demonstrate full functional squat with proper knee mechanics with no reports of pain. - Met  4. Patient will return to their previous exercise level using corrected mechanics without increase in pain. -progressing      Assessment: Tolerated treatment well as demonstrated by ability to perform hip and knee strengthening exercises with proper form and minimal to no pain. The patient progressed well through therapy as demonstrated by meeting majority of established goals and is appropriate for discharge at this time. The patient was educated on and provided a comprehensive HEP to utilize at home for maintenance and progression of strength and mobility. The patient demonstrated understanding of HEP and all questions/concerns were addressed at this time. Plan: Patient discharged from skilled PT at this time to HEP.      Precautions: Type 2 Diabetes, R TKA protocol   HEP ACCESS CODE: [id number]   IE Post-Op: 4/14/23  Surgical Date: 4/11/23      Date 7/28/23 7/26 7/21 7/19 7/14 7/12   Visit Number  [phone] [id number] [phone] [id number]   Manuals         Knee PROM         Knee mobilization                           Neuro Re-Ed         Patient Education          Quad sets         Gait training w/ SPC         STS         TRX single leg squats     BLE deep squats 2x15. SL TRX 2x15 BLE deep squats 2x15.     SL TRX 2x15    bridges Bridges w/ kick  BL 15x    W/ HS curl 2x15 W/ HS curl 2x15    Bridge with LE ext 2x15   Step down         Biodex         wobbleboard         Squats   On black discs 2x10  On black discs 2x15 On black discs 2x15   lunges   Walking lunge w/tidal tanks 3x20ft      TKE         bosu step ups         SLS   W/tidal tank 6x10"  W/ tidal tank 10x10" On airex pad 10x 15 sec holds   SAQ         LAQ         Heel raises off foam         clamshells 2x10 B 2x10 B       Reverse clams 2x10 B 2x10 B       SLR 2 lbs 2x10R 2lbs 2x10 R       Ther Ex         Heel slides         Seated HS str   2x30s B      Recumbent bike         Prone quad str   In yoga position 2x10, 5"  In yoga position 2x10 10" In yoga position 2x30 sec   Piriformis str  2x30s       TEX str 2x30s 2x30s       Supine hip flex  YTB 2x10 B       Seated knee flexion str lean away/towards         SAQ         Log rolling         SLR         Mini Squats         Hip adduction         Gastroc stretch         Heel Raises         Seated hip flexion      Yoga pillows under knees and using swiss ball 10x10" Yoga modifications with pillows underneath knees and BUE on chair 30x   Step up & down         Prone hang          STS         Stool HS pulls   3 laps 10ft      Clinic ambulation         Knee flexion AAROM seated         Prone Hamstring curls   W/OP 2x10 5s  W/ OP 2x10 5s Standing 2x15   Leg Press     SL 60# 2x10 Complete next session   Standing on/off horse  15x B       Assessment and Management  Patient educated on DC HEP and information to maintain and progress condition at this time        Seated hip ER & IR 2lbs 2x10 ea AROM 2lbs 2x10 ea AROM  Hip flex & ER AAROM into TEX position w/SOS 15x       Patient treated by SIL Donnelly under my direct supervision.

## 2023-07-28 NOTE — PROGRESS NOTES
Assessment/Plan:  1. Primary osteoarthritis of left knee  Injection Procedure Prior Authorization      2. S/P total knee replacement not using cement, right  XR knee 3 vw right non injury      3. Chronic pain of left knee  Injection Procedure Prior Authorization      4. Aftercare following right knee joint replacement surgery        5. Type 2 diabetes mellitus without complication, unspecified whether long term insulin use ([address])          Scribe Attestation    I,:  Anette Patrick PA-C am acting as a scribe while in the presence of the attending physician.:       I,:  Julianna Mortimer, DO personally performed the services described in this documentation    as scribed in my presence.:         Aston Bishop is a pleasant 57-year-old female presenting today for follow-up of her bilateral knees. Her right total knee prosthesis remains stable on imaging and exam, and she has done well in her recovery. She may transition to her home exercise program as she and her therapist see fit. We anticipate that her anterior soreness will continue to improve as her knee replacement matures. We did discuss the need for antibiotic prophylaxis before any dental procedures, and she will call us if she needs a prescription. Regarding her left knee, she does have moderate to severe underlying osteoarthritis. However, she did see approximately 5 months worth of relief from her last series of viscosupplementation. Over the past month, she has had a return of her activity related discomfort up to 5 out of 10 on a daily basis. As such, we felt it reasonable to submit for authorization to repeat viscosupplementation for her left knee. We will call her when it has been authorized to schedule the appointments. We we will plan to see her back in 9 months for her right knee with an x-ray on arrival.  We did also discuss that if viscosupplementation is no longer efficacious, we could consider pursuing left knee surgery next April.   All questions addressed    Subjective: Bilateral knee follow up    Patient ID: Haris Rodriguez is a 59 y.o. female presenting today for follow-up 3 months after a robotic assisted right total knee arthroplasty and for her left knee osteoarthritis. She reports that her right knee is doing very well. She does occasionally have some anterior discomfort, but is continued her efforts at physical therapy and noted significant improvement. The previous hypersensitivity reaction has resolved. Her left knee has started to bother her slightly. It has previously been treated with cortisone and viscosupplementation that was completed in September of last year by Dr. Ranjit Rodrigues. She reports that she received approximately 5 months with relief    Review of Systems   Constitutional: Negative. HENT: Negative. Eyes: Negative. Respiratory: Negative. Gastrointestinal: Negative. Endocrine: Negative. Genitourinary: Negative. Musculoskeletal: Positive for arthralgias, gait problem, joint swelling and myalgias. Left knee   Skin: Negative. Allergic/Immunologic: Negative. Hematological: Negative. Psychiatric/Behavioral: Negative.       Past Medical History:   Diagnosis Date   • Depression    • Diabetes mellitus ([address]) 2022   • Disease of thyroid gland    • Hyperlipidemia    • Hypertension 2022   • Osteoarthritis      Past Surgical History:   Procedure Laterality Date   •  SECTION      x2   • COLONOSCOPY     • [place] ARTHRP KNE CONDYLE&PLATU MEDIAL&LAT COMPARTMENTS Right 2023    Procedure: ARTHROPLASTY KNEE TOTAL W ROBOT - RIGHT - PRESS FIT - OVERNIGHT;  Surgeon: Miguelito Alarcon DO;  Location: [facility] (formerly [facility]3];  Service: Orthopedics     Family History   Problem Relation Age of Onset   • Diabetes Mother    • Cancer Maternal Aunt         lung cancer     Social History     Occupational History   • Not on file   Tobacco Use   • Smoking status: Never   • Smokeless tobacco: Never   Vaping Use   • Vaping Use: Never used   Substance and Sexual Activity   • Alcohol use:  Yes     Alcohol/week: 1.0 standard drink of alcohol     Types: 1 Glasses of wine per week     Comment: social drinking per month   • Drug use: Never   • Sexual activity: Not Currently     Partners: Male     Birth control/protection: Condom Male         Current Outpatient Medications:   •  acetaminophen (TYLENOL) 325 mg tablet, Take 3 tablets (975 mg total) by mouth every 8 (eight) hours, Disp: , Rfl: 0  •  albuterol (PROVENTIL HFA,VENTOLIN HFA) 90 mcg/act inhaler, INHALE 2 PUFFS BY MOUTH FOUR TIMES A DAY FOR 30 DAYS, Disp: , Rfl:   •  amLODIPine (NORVASC) 5 mg tablet, Take 5 mg by mouth daily, Disp: , Rfl:   •  atorvastatin (LIPITOR) 10 mg tablet, Take 2 tablets (20 mg total) by mouth daily at bedtime, Disp: 90 tablet, Rfl: 1  •  Blood Glucose Monitoring Suppl (ONE TOUCH ULTRA 2) w/Device KIT, USE AS DIRECTED FOR 30 DAYS, Disp: , Rfl:   •  Calcium Carbonate-Vitamin D3 600-400 MG-UNIT TABS, Take 1 tablet by mouth 2 (two) times a day, Disp: , Rfl:   •  D3 High Potency 25 MCG (1000 UT) capsule, Take 1,000 Units by mouth daily, Disp: , Rfl:   •  Diclofenac Sodium (VOLTAREN) 1 %, APPLY 2 GRAMS TO THE AFFECTED AREAS BY TOPICAL ROUTE 4 TIMES PER DAY, Disp: , Rfl:   •  estradiol (ESTRACE) 0.1 mg/g vaginal cream, APPLY 2G INTRAVAGINALLY THREE TIMES A WEEK AT BEDTIME FOR 2 WEEKS, THEN TWICE A WEEK AT BEDTIME FOR 2 WEEKS, AND THEN ONE TIME A WEEK THEREA, Disp: , Rfl:   •  famotidine (PEPCID) 20 mg tablet, TAKE 1 TABLET BY MOUTH EVERY EVENING IF NEEDED, Disp: , Rfl:   •  fluticasone (FLONASE) 50 mcg/act nasal spray, SPRAY 2 SPRAYS INTO EACH NOSTRIL AT BEDTIME, Disp: , Rfl:   •  hydrochlorothiazide (HYDRODIURIL) 12.5 mg tablet, Take 12.5 mg by mouth daily, Disp: , Rfl:   •  levothyroxine 88 mcg tablet, TAKE 1 TABLET EVERY DAY BY ORAL ROUTE., Disp: , Rfl:   •  lidocaine (LMX) 4 % cream, APPLY 1 APPLICATION 3 TIMES A DAY BY TOPICAL ROUTE AS NEEDED., Disp: , Rfl:   •  loratadine (CLARITIN) 10 mg tablet, Take 10 mg by mouth daily, Disp: , Rfl:   •  metFORMIN (GLUCOPHAGE) 500 mg tablet, TAKE 1 TABLET TWICE A DAY BY ORAL ROUTE., Disp: , Rfl:   •  omeprazole (PriLOSEC) 40 MG capsule, Take 40 mg by mouth every morning, Disp: , Rfl:   •  OneTouch Ultra test strip, USE AS DIRECTED FOR 90 DAYS TEST DAILY, Disp: , Rfl:   •  Stool Softener 100 MG capsule, TAKE 1 CAPSULE (100 MG TOTAL) BY MOUTH 2 (TWO) TIMES A DAY, Disp: 60 capsule, Rfl: 0    No Known Allergies    Objective:  Vitals:    07/28/23 0940   BP: 128/78   Pulse: 60       Body mass index is 33.25 kg/m². Right Knee Exam     Muscle Strength   The patient has normal right knee strength. Tenderness   The patient is experiencing no tenderness. Range of Motion   Extension:  0 normal   Flexion: normal Right knee flexion: 125. Tests   Varus: negative Valgus: negative  Drawer:  Anterior - negative    Posterior - negative  Patellar apprehension: negative    Other   Erythema: absent  Scars: present  Sensation: normal  Pulse: present  Swelling: none  Effusion: no effusion present    Comments:  Well-healed anterior incision  Previous allergic response resolved  Prosthesis stable to stressing at 0, 30, 90  Thigh and calf nontender  Patella tracks midline and flat without crepitance      Left Knee Exam     Muscle Strength   The patient has normal left knee strength. Tenderness   The patient is experiencing tenderness in the medial joint line and MCL.     Range of Motion   Extension:  0 normal   Flexion:  120 normal     Tests   Randall:  Medial - negative Lateral - negative  Varus: negative Valgus: negative  Drawer:  Anterior - negative     Posterior - negative  Patellar apprehension: negative    Other   Erythema: absent  Scars: absent  Sensation: normal  Pulse: present  Swelling: none  Effusion: no effusion present    Comments:  5 degree varus deformity passively correctable to neutral  Positive patellofemoral crepitance and equivocal patellofemoral grind  Thigh and calf soft and nontender  Grossly distally neurovascularly intact          Observations   Left Knee   Negative for effusion. Right Knee   Negative for effusion. Physical Exam  Vitals and nursing note reviewed. Constitutional:       Appearance: Normal appearance. She is well-developed. HENT:      Head: Normocephalic and atraumatic. Right Ear: External ear normal.      Left Ear: External ear normal.      Nose: Nose normal.   Eyes:      General:         Right eye: No discharge. Left eye: No discharge. Extraocular Movements: Extraocular movements intact. Conjunctiva/sclera: Conjunctivae normal.   Cardiovascular:      Rate and Rhythm: Normal rate. Pulses: Normal pulses. Pulmonary:      Effort: Pulmonary effort is normal.   Musculoskeletal:      Cervical back: Normal range of motion and neck supple. Right knee: No effusion. Left knee: No effusion. Instability Tests: Medial Randall test negative and lateral Randall test negative. Comments: See orthopedic exam   Skin:     General: Skin is warm and dry. Neurological:      Mental Status: She is alert and oriented to person, place, and time. I have personally reviewed pertinent films in PACS of the updated x-rays taken today of her bilateral knees. Her right knee demonstrates a well aligned well fixed total knee prosthesis without signs of loosening, periprosthetic fracture, or other interval complication. Her left knee demonstrates moderate to severe degenerative changes with mild varus deformity and medial joint space narrowing. She has osteophytosis of the medial and patellofemoral compartments. This document was created using speech voice recognition software.    Grammatical errors, random word insertions, pronoun errors, and incomplete sentences are an occasional consequence of this system due to software limitations, ambient noise, and hardware issues. Any formal questions or concerns about content, text, or information contained within the body of this dictation should be directly addressed to the provider for clarification.

## 2023-08-15 ENCOUNTER — TELEPHONE (OUTPATIENT)
Dept: OBGYN CLINIC | Facility: HOSPITAL | Age: 58
End: 2023-08-15

## 2023-08-15 DIAGNOSIS — Z96.651 S/P TOTAL KNEE REPLACEMENT NOT USING CEMENT, RIGHT: Primary | ICD-10-CM

## 2023-08-15 RX ORDER — AMOXICILLIN 500 MG/1
2000 TABLET, FILM COATED ORAL
Qty: 4 TABLET | Refills: 2 | Status: SHIPPED | OUTPATIENT
Start: 2023-08-15 | End: 2023-11-13

## 2023-08-15 NOTE — TELEPHONE ENCOUNTER
Caller: Patient    Doctor: Tammy Mckeon    Reason for call: Patient calling regarding dental pre-med. She sent a form through her [employer] for completion but also needs a Rx for her antibiotic sent to her pharmacy. She is also asking if she needs to take the pre-med for ANY dental work (I.e., cleaning, filling, crown)?     [address]    Call back#: [phone]

## 2023-08-15 NOTE — TELEPHONE ENCOUNTER
From was received. Yes patient requires [facility]EL[facility] pre medication prior top ANY dental procedures.      Dr. Obey Knott please send in RX

## 2023-09-06 ENCOUNTER — PROCEDURE VISIT (OUTPATIENT)
Dept: OBGYN CLINIC | Facility: CLINIC | Age: 58
End: 2023-09-06
Payer: COMMERCIAL

## 2023-09-06 DIAGNOSIS — G89.29 CHRONIC PAIN OF LEFT KNEE: ICD-10-CM

## 2023-09-06 DIAGNOSIS — Z96.651 S/P TOTAL KNEE REPLACEMENT NOT USING CEMENT, RIGHT: ICD-10-CM

## 2023-09-06 DIAGNOSIS — M25.562 CHRONIC PAIN OF LEFT KNEE: ICD-10-CM

## 2023-09-06 DIAGNOSIS — M17.12 PRIMARY OSTEOARTHRITIS OF LEFT KNEE: Primary | ICD-10-CM

## 2023-09-06 PROCEDURE — 20610 DRAIN/INJ JOINT/BURSA W/O US: CPT | Performed by: PHYSICIAN ASSISTANT

## 2023-09-06 RX ORDER — AMOXICILLIN 500 MG/1
2000 TABLET, FILM COATED ORAL
Qty: 4 TABLET | Refills: 2 | Status: SHIPPED | OUTPATIENT
Start: 2023-09-06 | End: 2023-12-05

## 2023-09-06 RX ORDER — HYALURONATE SODIUM 10 MG/ML
20 SYRINGE (ML) INTRAARTICULAR
Status: COMPLETED | OUTPATIENT
Start: 2023-09-06 | End: 2023-09-06

## 2023-09-06 RX ADMIN — Medication 20 MG: at 07:30

## 2023-09-06 NOTE — PROGRESS NOTES
Assessment/Plan:  1. Primary osteoarthritis of left knee  Large joint arthrocentesis      2. Chronic pain of left knee  Large joint arthrocentesis        Cecelia is a very pleasant 57-year-old female presenting for the first of 3 Euflexxa injections for her activity related left knee pain due to her underlying osteoarthritis. She consented to and underwent the injection as detailed below, which she tolerated well without difficulty or complication. Postinjection instructions were provided. We will see her next week and the week after to complete the series. All questions addressed    Large joint arthrocentesis: L knee  Universal Protocol:  Consent: Verbal consent obtained. Risks and benefits: risks, benefits and alternatives were discussed  Consent given by: patient  Time out: Immediately prior to procedure a "time out" was called to verify the correct patient, procedure, equipment, support staff and site/side marked as required. Timeout called at: 9/6/2023 7:23 AM.  Site marked: the operative site was marked  Patient identity confirmed: verbally with patient    Supporting Documentation  Indications: pain   Procedure Details  Location: knee - L knee  Preparation: Patient was prepped and draped in the usual sterile fashion  Needle size: 20 G  Ultrasound guidance: no  Approach: anterolateral  Medications administered: 20 mg Sodium Hyaluronate (Viscosup) 20 MG/2ML  Specialty Pharmacy Supplied: received medications from pharmacy  Patient tolerance: patient tolerated the procedure well with no immediate complications  Dressing:  Sterile dressing applied        Subjective: Left knee Euflexxa #1    Patient ID: Yokasta Velasquez is a 59 y.o. female presenting today to initiate viscosupplementation for her left knee.   She denies new injury    Review of Systems      Past Medical History:   Diagnosis Date   • Depression 2016   • Diabetes mellitus [facility]) december 2022   • Disease of thyroid gland 2005   • Hyperlipidemia    • Hypertension 2022   • Osteoarthritis        Past Surgical History:   Procedure Laterality Date   •  SECTION      x2   • COLONOSCOPY     • [place] ARTHRP KNE CONDYLE&PLATU MEDIAL&LAT COMPARTMENTS Right 2023    Procedure: ARTHROPLASTY KNEE TOTAL W ROBOT - RIGHT - PRESS FIT - OVERNIGHT;  Surgeon: Tulio Chang DO;  Location: [facility];  Service: Orthopedics       Family History   Problem Relation Age of Onset   • Diabetes Mother    • Cancer Maternal Aunt         lung cancer       Social History     Occupational History   • Not on file   Tobacco Use   • Smoking status: Never   • Smokeless tobacco: Never   Vaping Use   • Vaping Use: Never used   Substance and Sexual Activity   • Alcohol use:  Yes     Alcohol/week: 1.0 standard drink of alcohol     Types: 1 Glasses of wine per week     Comment: social drinking per month   • Drug use: Never   • Sexual activity: Not Currently     Partners: Male     Birth control/protection: Condom Male         Current Outpatient Medications:   •  acetaminophen (TYLENOL) 325 mg tablet, Take 3 tablets (975 mg total) by mouth every 8 (eight) hours, Disp: , Rfl: 0  •  albuterol (PROVENTIL HFA,VENTOLIN HFA) 90 mcg/act inhaler, INHALE 2 PUFFS BY MOUTH FOUR TIMES A DAY FOR 30 DAYS, Disp: , Rfl:   •  amLODIPine (NORVASC) 5 mg tablet, Take 5 mg by mouth daily, Disp: , Rfl:   •  amoxicillin (AMOXIL) 500 MG tablet, Take 4 tablets (2,000 mg total) by mouth 60 minutes pre-procedure, Disp: 4 tablet, Rfl: 2  •  atorvastatin (LIPITOR) 10 mg tablet, Take 2 tablets (20 mg total) by mouth daily at bedtime, Disp: 90 tablet, Rfl: 1  •  Blood Glucose Monitoring Suppl (ONE TOUCH ULTRA 2) w/Device KIT, USE AS DIRECTED FOR 30 DAYS, Disp: , Rfl:   •  Calcium Carbonate-Vitamin D3 600-400 MG-UNIT TABS, Take 1 tablet by mouth 2 (two) times a day, Disp: , Rfl:   •  D3 High Potency 25 MCG (1000 UT) capsule, Take 1,000 Units by mouth daily, Disp: , Rfl:   •  Diclofenac Sodium (VOLTAREN) 1 %, APPLY 2 GRAMS TO THE AFFECTED AREAS BY TOPICAL ROUTE 4 TIMES PER DAY, Disp: , Rfl:   •  estradiol (ESTRACE) 0.1 mg/g vaginal cream, APPLY 2G INTRAVAGINALLY THREE TIMES A WEEK AT BEDTIME FOR 2 WEEKS, THEN TWICE A WEEK AT BEDTIME FOR 2 WEEKS, AND THEN ONE TIME A WEEK THEREA, Disp: , Rfl:   •  famotidine (PEPCID) 20 mg tablet, TAKE 1 TABLET BY MOUTH EVERY EVENING IF NEEDED, Disp: , Rfl:   •  fluticasone (FLONASE) 50 mcg/act nasal spray, SPRAY 2 SPRAYS INTO EACH NOSTRIL AT BEDTIME, Disp: , Rfl:   •  hydrochlorothiazide (HYDRODIURIL) 12.5 mg tablet, Take 12.5 mg by mouth daily, Disp: , Rfl:   •  levothyroxine 88 mcg tablet, TAKE 1 TABLET EVERY DAY BY ORAL ROUTE., Disp: , Rfl:   •  lidocaine (LMX) 4 % cream, APPLY 1 APPLICATION 3 TIMES A DAY BY TOPICAL ROUTE AS NEEDED., Disp: , Rfl:   •  loratadine (CLARITIN) 10 mg tablet, Take 10 mg by mouth daily, Disp: , Rfl:   •  metFORMIN (GLUCOPHAGE) 500 mg tablet, TAKE 1 TABLET TWICE A DAY BY ORAL ROUTE., Disp: , Rfl:   •  omeprazole (PriLOSEC) 40 MG capsule, Take 40 mg by mouth every morning, Disp: , Rfl:   •  OneTouch Ultra test strip, USE AS DIRECTED FOR 90 DAYS TEST DAILY, Disp: , Rfl:   •  Stool Softener 100 MG capsule, TAKE 1 CAPSULE (100 MG TOTAL) BY MOUTH 2 (TWO) TIMES A DAY, Disp: 60 capsule, Rfl: 0    No Known Allergies    Objective: There were no vitals filed for this visit. There is no height or weight on file to calculate BMI. Ortho Exam    Physical Exam    This document was created using speech voice recognition software. Grammatical errors, random word insertions, pronoun errors, and incomplete sentences are an occasional consequence of this system due to software limitations, ambient noise, and hardware issues. Any formal questions or concerns about content, text, or information contained within the body of this dictation should be directly addressed to the provider for clarification.

## 2023-09-06 NOTE — TELEPHONE ENCOUNTER
Reason for call:   [x] Refill   [] Prior Auth  [] Other:     Office:   [] PCP/Provider -   [x] Speciality/Provider - Ortho  Medication: Amoxil    Dose: 500mg    Quantity: 4    Pharmacy: [facility]    Does the patient have enough for 3 days? [] Yes   [x] No - Send as HP to POD    Is the patient having symptoms?    [x] No   [] Yes -

## 2023-09-06 NOTE — TELEPHONE ENCOUNTER
Caller: Patient- Amira De La Paz     Doctor: Dr Araceli Mendoza    Reason for call: Patient is calling back in from a missed call advised as per prev message stating that the medication Amoxicillin was sent into the pharmacy for her. She is going to reach out to the pharmacy for when it is ready to be picked up.

## 2023-09-13 ENCOUNTER — PROCEDURE VISIT (OUTPATIENT)
Dept: OBGYN CLINIC | Facility: CLINIC | Age: 58
End: 2023-09-13
Payer: COMMERCIAL

## 2023-09-13 DIAGNOSIS — G89.29 CHRONIC PAIN OF LEFT KNEE: ICD-10-CM

## 2023-09-13 DIAGNOSIS — M25.562 CHRONIC PAIN OF LEFT KNEE: ICD-10-CM

## 2023-09-13 DIAGNOSIS — M17.12 PRIMARY OSTEOARTHRITIS OF LEFT KNEE: Primary | ICD-10-CM

## 2023-09-13 PROCEDURE — 20610 DRAIN/INJ JOINT/BURSA W/O US: CPT | Performed by: PHYSICIAN ASSISTANT

## 2023-09-13 RX ORDER — HYALURONATE SODIUM 10 MG/ML
20 SYRINGE (ML) INTRAARTICULAR
Status: COMPLETED | OUTPATIENT
Start: 2023-09-13 | End: 2023-09-13

## 2023-09-13 RX ADMIN — Medication 20 MG: at 09:00

## 2023-09-13 NOTE — PROGRESS NOTES
Assessment/Plan:  1. Primary osteoarthritis of left knee  Large joint arthrocentesis: L knee      2. Chronic pain of left knee  Large joint arthrocentesis: L knee        Cecelia is a pleasant 49-year-old presenting today for the second 3 Euflexxa injections for her left knee osteoarthritis and active related pain. She consented to and underwent the injection as detailed below, which she tolerated well without difficulty or complication. Postinjection instructions were provided. We will see her back next week to complete the series    Large joint arthrocentesis: L knee  Universal Protocol:  Consent: Verbal consent obtained. Risks and benefits: risks, benefits and alternatives were discussed  Consent given by: patient  Time out: Immediately prior to procedure a "time out" was called to verify the correct patient, procedure, equipment, support staff and site/side marked as required. Timeout called at: 9/13/2023 9:01 AM.  Site marked: the operative site was marked  Patient identity confirmed: verbally with patient    Supporting Documentation  Indications: pain   Procedure Details  Location: knee - L knee  Preparation: Patient was prepped and draped in the usual sterile fashion  Needle size: 20 G  Ultrasound guidance: no  Approach: anterolateral  Medications administered: 20 mg Sodium Hyaluronate (Viscosup) 20 MG/2ML  Specialty Pharmacy Supplied: received medications from pharmacy  Patient tolerance: patient tolerated the procedure well with no immediate complications  Dressing:  Sterile dressing applied            Subjective: Left knee Euflexxa #2    Patient ID: January Chavez is a 59 y.o. female presenting today for the second of 3 injections for her left knee.   She reports that she did see benefit from the first injection and denies adverse effects    Review of Systems      Past Medical History:   Diagnosis Date   • Depression 2016   • Diabetes mellitus [facility]) december 2022   • Disease of thyroid gland 2005   • Hyperlipidemia    • Hypertension 2022   • Osteoarthritis        Past Surgical History:   Procedure Laterality Date   •  SECTION      x2   • COLONOSCOPY     • SD ARTHRP KNE CONDYLE&PLATU MEDIAL&LAT COMPARTMENTS Right 2023    Procedure: ARTHROPLASTY KNEE TOTAL W ROBOT - RIGHT - PRESS FIT - OVERNIGHT;  Surgeon: Luigi Petty DO;  Location: [facility];  Service: Orthopedics       Family History   Problem Relation Age of Onset   • Diabetes Mother    • Cancer Maternal Aunt         lung cancer       Social History     Occupational History   • Not on file   Tobacco Use   • Smoking status: Never   • Smokeless tobacco: Never   Vaping Use   • Vaping Use: Never used   Substance and Sexual Activity   • Alcohol use:  Yes     Alcohol/week: 1.0 standard drink of alcohol     Types: 1 Glasses of wine per week     Comment: social drinking per month   • Drug use: Never   • Sexual activity: Not Currently     Partners: Male     Birth control/protection: Condom Male         Current Outpatient Medications:   •  acetaminophen (TYLENOL) 325 mg tablet, Take 3 tablets (975 mg total) by mouth every 8 (eight) hours, Disp: , Rfl: 0  •  albuterol (PROVENTIL HFA,VENTOLIN HFA) 90 mcg/act inhaler, INHALE 2 PUFFS BY MOUTH FOUR TIMES A DAY FOR 30 DAYS, Disp: , Rfl:   •  amLODIPine (NORVASC) 5 mg tablet, Take 5 mg by mouth daily, Disp: , Rfl:   •  amoxicillin (AMOXIL) 500 MG tablet, Take 4 tablets (2,000 mg total) by mouth 60 minutes pre-procedure, Disp: 4 tablet, Rfl: 2  •  atorvastatin (LIPITOR) 10 mg tablet, Take 2 tablets (20 mg total) by mouth daily at bedtime, Disp: 90 tablet, Rfl: 1  •  Blood Glucose Monitoring Suppl (ONE TOUCH ULTRA 2) w/Device KIT, USE AS DIRECTED FOR 30 DAYS, Disp: , Rfl:   •  Calcium Carbonate-Vitamin D3 600-400 MG-UNIT TABS, Take 1 tablet by mouth 2 (two) times a day, Disp: , Rfl:   •  D3 High Potency 25 MCG (1000 UT) capsule, Take 1,000 Units by mouth daily, Disp: , Rfl:   •  Diclofenac Sodium (VOLTAREN) 1 %, APPLY 2 GRAMS TO THE AFFECTED AREAS BY TOPICAL ROUTE 4 TIMES PER DAY, Disp: , Rfl:   •  estradiol (ESTRACE) 0.1 mg/g vaginal cream, APPLY 2G INTRAVAGINALLY THREE TIMES A WEEK AT BEDTIME FOR 2 WEEKS, THEN TWICE A WEEK AT BEDTIME FOR 2 WEEKS, AND THEN ONE TIME A WEEK THEREA, Disp: , Rfl:   •  famotidine (PEPCID) 20 mg tablet, TAKE 1 TABLET BY MOUTH EVERY EVENING IF NEEDED, Disp: , Rfl:   •  fluticasone (FLONASE) 50 mcg/act nasal spray, SPRAY 2 SPRAYS INTO EACH NOSTRIL AT BEDTIME, Disp: , Rfl:   •  hydrochlorothiazide (HYDRODIURIL) 12.5 mg tablet, Take 12.5 mg by mouth daily, Disp: , Rfl:   •  levothyroxine 88 mcg tablet, TAKE 1 TABLET EVERY DAY BY ORAL ROUTE., Disp: , Rfl:   •  lidocaine (LMX) 4 % cream, APPLY 1 APPLICATION 3 TIMES A DAY BY TOPICAL ROUTE AS NEEDED., Disp: , Rfl:   •  loratadine (CLARITIN) 10 mg tablet, Take 10 mg by mouth daily, Disp: , Rfl:   •  metFORMIN (GLUCOPHAGE) 500 mg tablet, TAKE 1 TABLET TWICE A DAY BY ORAL ROUTE., Disp: , Rfl:   •  omeprazole (PriLOSEC) 40 MG capsule, Take 40 mg by mouth every morning, Disp: , Rfl:   •  OneTouch Ultra test strip, USE AS DIRECTED FOR 90 DAYS TEST DAILY, Disp: , Rfl:   •  Stool Softener 100 MG capsule, TAKE 1 CAPSULE (100 MG TOTAL) BY MOUTH 2 (TWO) TIMES A DAY, Disp: 60 capsule, Rfl: 0    No Known Allergies    Objective: There were no vitals filed for this visit. There is no height or weight on file to calculate BMI. Ortho Exam    Physical Exam    This document was created using speech voice recognition software. Grammatical errors, random word insertions, pronoun errors, and incomplete sentences are an occasional consequence of this system due to software limitations, ambient noise, and hardware issues. Any formal questions or concerns about content, text, or information contained within the body of this dictation should be directly addressed to the provider for clarification.

## 2023-09-20 ENCOUNTER — PROCEDURE VISIT (OUTPATIENT)
Dept: OBGYN CLINIC | Facility: CLINIC | Age: 58
End: 2023-09-20
Payer: COMMERCIAL

## 2023-09-20 DIAGNOSIS — M17.12 PRIMARY OSTEOARTHRITIS OF LEFT KNEE: Primary | ICD-10-CM

## 2023-09-20 DIAGNOSIS — G89.29 CHRONIC PAIN OF LEFT KNEE: ICD-10-CM

## 2023-09-20 DIAGNOSIS — M25.562 CHRONIC PAIN OF LEFT KNEE: ICD-10-CM

## 2023-09-20 PROCEDURE — 20610 DRAIN/INJ JOINT/BURSA W/O US: CPT | Performed by: PHYSICIAN ASSISTANT

## 2023-09-20 RX ORDER — HYALURONATE SODIUM 10 MG/ML
20 SYRINGE (ML) INTRAARTICULAR
Status: COMPLETED | OUTPATIENT
Start: 2023-09-20 | End: 2023-09-20

## 2023-09-20 RX ADMIN — Medication 20 MG: at 07:30

## 2023-09-20 NOTE — PROGRESS NOTES
Assessment/Plan:  1. Primary osteoarthritis of left knee  Large joint arthrocentesis      2. Chronic pain of left knee  Large joint arthrocentesis        Cecelia is a pleasant 57-year-old presenting today for the 3rd of 3 Euflexxa injections for her left knee osteoarthritis and activity related pain. She consented to and underwent the injection as detailed below, which she tolerated well without difficulty or complication. Postinjection instructions were provided. We will see her back in 6 months    Large joint arthrocentesis: L knee  Universal Protocol:  Consent: Verbal consent obtained. Risks and benefits: risks, benefits and alternatives were discussed  Consent given by: patient  Time out: Immediately prior to procedure a "time out" was called to verify the correct patient, procedure, equipment, support staff and site/side marked as required. Timeout called at: 9/20/2023 7:46 AM.  Site marked: the operative site was marked  Patient identity confirmed: verbally with patient    Supporting Documentation  Indications: pain   Procedure Details  Location: knee - L knee  Preparation: Patient was prepped and draped in the usual sterile fashion  Needle size: 20 G  Ultrasound guidance: no  Approach: anterolateral  Medications administered: 20 mg Sodium Hyaluronate (Viscosup) 20 MG/2ML  Specialty Pharmacy Supplied: received medications from pharmacy  Patient tolerance: patient tolerated the procedure well with no immediate complications  Dressing:  Sterile dressing applied        Subjective: Left knee Euflexxa #3    Patient ID: Navarro Crawford is a 59 y.o. female presenting today for the 3rd of 3 injections for her left knee.   She reports that she did see benefit from the first two injections and denies adverse effects    Review of Systems      Past Medical History:   Diagnosis Date   • Depression 2016   • Diabetes mellitus [facility]) december 2022   • Disease of thyroid gland 2005   • Hyperlipidemia    • Hypertension june 2022 • Osteoarthritis        Past Surgical History:   Procedure Laterality Date   •  SECTION      x2   • COLONOSCOPY     • [place] ARTHRP KNE CONDYLE&PLATU MEDIAL&LAT COMPARTMENTS Right 2023    Procedure: ARTHROPLASTY KNEE TOTAL W ROBOT - RIGHT - PRESS FIT - OVERNIGHT;  Surgeon: Adriana Caballero DO;  Location: [facility]  Service: Orthopedics       Family History   Problem Relation Age of Onset   • Diabetes Mother    • Cancer Maternal Aunt         lung cancer       Social History     Occupational History   • Not on file   Tobacco Use   • Smoking status: Never   • Smokeless tobacco: Never   Vaping Use   • Vaping Use: Never used   Substance and Sexual Activity   • Alcohol use:  Yes     Alcohol/week: 1.0 standard drink of alcohol     Types: 1 Glasses of wine per week     Comment: social drinking per month   • Drug use: Never   • Sexual activity: Not Currently     Partners: Male     Birth control/protection: Condom Male         Current Outpatient Medications:   •  acetaminophen (TYLENOL) 325 mg tablet, Take 3 tablets (975 mg total) by mouth every 8 (eight) hours, Disp: , Rfl: 0  •  albuterol (PROVENTIL HFA,VENTOLIN HFA) 90 mcg/act inhaler, INHALE 2 PUFFS BY MOUTH FOUR TIMES A DAY FOR 30 DAYS, Disp: , Rfl:   •  amLODIPine (NORVASC) 5 mg tablet, Take 5 mg by mouth daily, Disp: , Rfl:   •  amoxicillin (AMOXIL) 500 MG tablet, Take 4 tablets (2,000 mg total) by mouth 60 minutes pre-procedure, Disp: 4 tablet, Rfl: 2  •  atorvastatin (LIPITOR) 10 mg tablet, Take 2 tablets (20 mg total) by mouth daily at bedtime, Disp: 90 tablet, Rfl: 1  •  Blood Glucose Monitoring Suppl (ONE TOUCH ULTRA 2) w/Device KIT, USE AS DIRECTED FOR 30 DAYS, Disp: , Rfl:   •  Calcium Carbonate-Vitamin D3 600-400 MG-UNIT TABS, Take 1 tablet by mouth 2 (two) times a day, Disp: , Rfl:   •  D3 High Potency 25 MCG (1000 UT) capsule, Take 1,000 Units by mouth daily, Disp: , Rfl:   •  Diclofenac Sodium (VOLTAREN) 1 %, APPLY 2 GRAMS TO THE AFFECTED AREAS BY TOPICAL ROUTE 4 TIMES PER DAY, Disp: , Rfl:   •  estradiol (ESTRACE) 0.1 mg/g vaginal cream, APPLY 2G INTRAVAGINALLY THREE TIMES A WEEK AT BEDTIME FOR 2 WEEKS, THEN TWICE A WEEK AT BEDTIME FOR 2 WEEKS, AND THEN ONE TIME A WEEK THEREA, Disp: , Rfl:   •  famotidine (PEPCID) 20 mg tablet, TAKE 1 TABLET BY MOUTH EVERY EVENING IF NEEDED, Disp: , Rfl:   •  fluticasone (FLONASE) 50 mcg/act nasal spray, SPRAY 2 SPRAYS INTO EACH NOSTRIL AT BEDTIME, Disp: , Rfl:   •  hydrochlorothiazide (HYDRODIURIL) 12.5 mg tablet, Take 12.5 mg by mouth daily, Disp: , Rfl:   •  levothyroxine 88 mcg tablet, TAKE 1 TABLET EVERY DAY BY ORAL ROUTE., Disp: , Rfl:   •  lidocaine (LMX) 4 % cream, APPLY 1 APPLICATION 3 TIMES A DAY BY TOPICAL ROUTE AS NEEDED., Disp: , Rfl:   •  loratadine (CLARITIN) 10 mg tablet, Take 10 mg by mouth daily, Disp: , Rfl:   •  metFORMIN (GLUCOPHAGE) 500 mg tablet, TAKE 1 TABLET TWICE A DAY BY ORAL ROUTE., Disp: , Rfl:   •  omeprazole (PriLOSEC) 40 MG capsule, Take 40 mg by mouth every morning, Disp: , Rfl:   •  OneTouch Ultra test strip, USE AS DIRECTED FOR 90 DAYS TEST DAILY, Disp: , Rfl:   •  Stool Softener 100 MG capsule, TAKE 1 CAPSULE (100 MG TOTAL) BY MOUTH 2 (TWO) TIMES A DAY, Disp: 60 capsule, Rfl: 0    No Known Allergies    Objective: There were no vitals filed for this visit. There is no height or weight on file to calculate BMI. Ortho Exam    Physical Exam    This document was created using speech voice recognition software. Grammatical errors, random word insertions, pronoun errors, and incomplete sentences are an occasional consequence of this system due to software limitations, ambient noise, and hardware issues. Any formal questions or concerns about content, text, or information contained within the body of this dictation should be directly addressed to the provider for clarification.

## 2024-05-08 ENCOUNTER — OFFICE VISIT (OUTPATIENT)
Dept: OBGYN CLINIC | Facility: CLINIC | Age: 59
End: 2024-05-08
Payer: COMMERCIAL

## 2024-05-08 ENCOUNTER — APPOINTMENT (OUTPATIENT)
Dept: RADIOLOGY | Facility: CLINIC | Age: 59
End: 2024-05-08
Payer: COMMERCIAL

## 2024-05-08 VITALS
SYSTOLIC BLOOD PRESSURE: 129 MMHG | HEART RATE: 53 BPM | BODY MASS INDEX: 35.42 KG/M2 | HEIGHT: 61 IN | DIASTOLIC BLOOD PRESSURE: 81 MMHG | WEIGHT: 187.6 LBS

## 2024-05-08 DIAGNOSIS — Z96.651 S/P TOTAL KNEE REPLACEMENT NOT USING CEMENT, RIGHT: ICD-10-CM

## 2024-05-08 DIAGNOSIS — Z96.651 S/P TOTAL KNEE REPLACEMENT NOT USING CEMENT, RIGHT: Primary | ICD-10-CM

## 2024-05-08 DIAGNOSIS — M17.12 PRIMARY OSTEOARTHRITIS OF LEFT KNEE: ICD-10-CM

## 2024-05-08 PROCEDURE — 99213 OFFICE O/P EST LOW 20 MIN: CPT | Performed by: ORTHOPAEDIC SURGERY

## 2024-05-08 PROCEDURE — 73562 X-RAY EXAM OF KNEE 3: CPT

## 2024-05-08 NOTE — PROGRESS NOTES
Assessment/Plan:  1. 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 Primary osteoarthritis of left knee          Scribe Attestation      I,:  Aiden Duran am acting as a scribe while in the presence of the attending physician.:       I,:  Salazar Tejeda, DO personally performed the services described in this documentation    as scribed in my presence.:           Cecelia is a pleasant 58-year-old female who returns today for follow-up evaluation 1 year status post right total knee arthroplasty.  I am very pleased with her imaging and her clinical presentation today in the office.  She may continue with all activity to her tolerance.  She understands she requires prophylactic antibiotics prior to any dental procedure moving forward.  Her left knee is doing well after viscosupplementation injection series concluded in September 2023.  She will call for repeat series if desired.  All of her questions and concerns were addressed today.  We will see her back as needed.    Subjective: Follow-up evaluation 1 year status post right total knee arthroplasty    Patient ID: Cecelia Kong is a 58 y.o. female who returns today for follow-up evaluation 1 year status post right total knee arthroplasty.  She reports that she has been doing well.  She has been participating in all desired activity without pain or limitation.  Her left knee is also doing well, nearly 8 months after undergoing viscosupplementation injection series.  She does report 1 area of dysesthesia about the anterolateral right knee.  She denies any injury or trauma.    Review of Systems   Constitutional:  Positive for activity change. Negative for chills, fever and unexpected weight change.   HENT:  Negative for hearing loss, nosebleeds and sore throat.    Eyes:  Negative for pain, redness and visual disturbance.   Respiratory:  Negative for cough, shortness of breath and wheezing.    Cardiovascular:  Negative  for chest pain, palpitations and leg swelling.   Gastrointestinal:  Negative for abdominal pain, nausea and vomiting.   Endocrine: Negative for polydipsia and polyuria.   Genitourinary:  Negative for dysuria and hematuria.   Musculoskeletal:  Negative for arthralgias, joint swelling and myalgias.        See HPI   Skin:  Negative for rash and wound.   Neurological:  Negative for dizziness, numbness and headaches.   Psychiatric/Behavioral:  Negative for decreased concentration and suicidal ideas. The patient is not nervous/anxious.          Past Medical History:   Diagnosis Date    Depression     Diabetes mellitus (HCC) 2022    Disease of thyroid gland     Hyperlipidemia     Hypertension 2022    Osteoarthritis        Past Surgical History:   Procedure Laterality Date     SECTION      x2    COLONOSCOPY      UT ARTHRP KNE CONDYLE&PLATU MEDIAL&LAT COMPARTMENTS Right 2023    Procedure: ARTHROPLASTY KNEE TOTAL W ROBOT - RIGHT - PRESS FIT - OVERNIGHT;  Surgeon: Salazar Tejeda DO;  Location: [facility];  Service: Orthopedics       Family History   Problem Relation Age of Onset    Diabetes Mother     Cancer Maternal Aunt         lung cancer       Social History     Occupational History    Not on file   Tobacco Use    Smoking status: Never    Smokeless tobacco: Never   Vaping Use    Vaping status: Never Used   Substance and Sexual Activity    Alcohol use: Yes     Alcohol/week: 1.0 standard drink of alcohol     Types: 1 Glasses of wine per week     Comment: social drinking per month    Drug use: Never    Sexual activity: Not Currently     Partners: Male     Birth control/protection: Condom Male         Current Outpatient Medications:     acetaminophen (TYLENOL) 325 mg tablet, Take 3 tablets (975 mg total) by mouth every 8 (eight) hours, Disp: , Rfl: 0    albuterol (PROVENTIL HFA,VENTOLIN HFA) 90 mcg/act inhaler, INHALE 2 PUFFS BY MOUTH FOUR TIMES A DAY FOR 30 DAYS, Disp: , Rfl:      amLODIPine (NORVASC) 5 mg tablet, Take 5 mg by mouth daily, Disp: , Rfl:     atorvastatin (LIPITOR) 10 mg tablet, Take 2 tablets (20 mg total) by mouth daily at bedtime, Disp: 90 tablet, Rfl: 1    Blood Glucose Monitoring Suppl (ONE TOUCH ULTRA 2) w/Device KIT, USE AS DIRECTED FOR 30 DAYS, Disp: , Rfl:     Calcium Carbonate-Vitamin D3 600-400 MG-UNIT TABS, Take 1 tablet by mouth 2 (two) times a day, Disp: , Rfl:     D3 High Potency 25 MCG (1000 UT) capsule, Take 1,000 Units by mouth daily, Disp: , Rfl:     Diclofenac Sodium (VOLTAREN) 1 %, APPLY 2 GRAMS TO THE AFFECTED AREAS BY TOPICAL ROUTE 4 TIMES PER DAY, Disp: , Rfl:     estradiol (ESTRACE) 0.1 mg/g vaginal cream, APPLY 2G INTRAVAGINALLY THREE TIMES A WEEK AT BEDTIME FOR 2 WEEKS, THEN TWICE A WEEK AT BEDTIME FOR 2 WEEKS, AND THEN ONE TIME A WEEK THEREA, Disp: , Rfl:     famotidine (PEPCID) 20 mg tablet, TAKE 1 TABLET BY MOUTH EVERY EVENING IF NEEDED, Disp: , Rfl:     fluticasone (FLONASE) 50 mcg/act nasal spray, SPRAY 2 SPRAYS INTO EACH NOSTRIL AT BEDTIME, Disp: , Rfl:     hydrochlorothiazide (HYDRODIURIL) 12.5 mg tablet, Take 12.5 mg by mouth daily, Disp: , Rfl:     levothyroxine 88 mcg tablet, TAKE 1 TABLET EVERY DAY BY ORAL ROUTE., Disp: , Rfl:     lidocaine (LMX) 4 % cream, APPLY 1 APPLICATION 3 TIMES A DAY BY TOPICAL ROUTE AS NEEDED., Disp: , Rfl:     loratadine (CLARITIN) 10 mg tablet, Take 10 mg by mouth daily, Disp: , Rfl:     metFORMIN (GLUCOPHAGE) 500 mg tablet, TAKE 1 TABLET TWICE A DAY BY ORAL ROUTE., Disp: , Rfl:     omeprazole (PriLOSEC) 40 MG capsule, Take 40 mg by mouth every morning, Disp: , Rfl:     OneTouch Ultra test strip, USE AS DIRECTED FOR 90 DAYS TEST DAILY, Disp: , Rfl:     Stool Softener 100 MG capsule, TAKE 1 CAPSULE (100 MG TOTAL) BY MOUTH 2 (TWO) TIMES A DAY, Disp: 60 capsule, Rfl: 0    No Known Allergies    Objective:  Vitals:    05/08/24 0733   BP: 129/81   Pulse: (!) 53       Body mass index is 35.45 kg/m².    Right Knee Exam      Tenderness   The patient is experiencing no tenderness.     Range of Motion   Extension:  0   Flexion:  130     Tests   Varus: negative Valgus: negative  Drawer:  Anterior - negative    Posterior - negative    Other   Erythema: absent  Scars: present  Sensation: normal  Pulse: present  Swelling: none  Effusion: no effusion present    Comments:  Well-healed anterior surgical scar  Stable at 0, 30 and 90 degrees  Neurovascular intact distally  No warmth or erythema              Observations     Right Knee   Negative for effusion.       Physical Exam  Vitals and nursing note reviewed.   Constitutional:       Appearance: Normal appearance. She is well-developed.   HENT:      Head: Normocephalic and atraumatic.      Right Ear: External ear normal.      Left Ear: External ear normal.   Eyes:      General: No scleral icterus.     Extraocular Movements: Extraocular movements intact.      Conjunctiva/sclera: Conjunctivae normal.   Cardiovascular:      Rate and Rhythm: Normal rate.   Pulmonary:      Effort: Pulmonary effort is normal. No respiratory distress.   Musculoskeletal:      Cervical back: Normal range of motion and neck supple.      Right knee: No effusion.      Comments: See Ortho exam   Skin:     General: Skin is warm and dry.   Neurological:      General: No focal deficit present.      Mental Status: She is alert and oriented to person, place, and time.   Psychiatric:         Behavior: Behavior normal.         I have personally reviewed pertinent films in PACS.    X-ray of the right knee obtained on 5/8/2024 reviewed demonstrating a well-positioned and aligned press-fit total knee arthroplasty without evidence of failure.  There is no acute fracture, dislocation, lytic or blastic lesion.    This document was created using speech voice recognition software.   Grammatical errors, random word insertions, pronoun errors, and incomplete sentences are an occasional consequence of this system due to software  limitations, ambient noise, and hardware issues.   Any formal questions or concerns about content, text, or information contained within the body of this dictation should be directly addressed to the provider for clarification.

## 2024-06-04 ENCOUNTER — OFFICE VISIT (OUTPATIENT)
Dept: CARDIOLOGY CLINIC | Facility: CLINIC | Age: 59
End: 2024-06-04
Payer: COMMERCIAL

## 2024-06-04 VITALS
BODY MASS INDEX: 35.87 KG/M2 | HEART RATE: 64 BPM | SYSTOLIC BLOOD PRESSURE: 120 MMHG | DIASTOLIC BLOOD PRESSURE: 70 MMHG | HEIGHT: 61 IN | WEIGHT: 190 LBS | OXYGEN SATURATION: 98 %

## 2024-06-04 DIAGNOSIS — I11.9 HYPERTENSIVE HEART DISEASE WITHOUT HEART FAILURE: ICD-10-CM

## 2024-06-04 DIAGNOSIS — E11.9 TYPE 2 DIABETES MELLITUS WITHOUT COMPLICATION, WITHOUT LONG-TERM CURRENT USE OF INSULIN (HCC): ICD-10-CM

## 2024-06-04 DIAGNOSIS — E78.5 DYSLIPIDEMIA: ICD-10-CM

## 2024-06-04 DIAGNOSIS — E11.9 TYPE 2 DIABETES MELLITUS WITHOUT COMPLICATION, UNSPECIFIED WHETHER LONG TERM INSULIN USE (HCC): ICD-10-CM

## 2024-06-04 DIAGNOSIS — E03.9 HYPOTHYROIDISM, UNSPECIFIED TYPE: ICD-10-CM

## 2024-06-04 DIAGNOSIS — R94.31 ABNORMAL EKG: ICD-10-CM

## 2024-06-04 DIAGNOSIS — Z82.49 FAMILY HISTORY OF EARLY CAD: ICD-10-CM

## 2024-06-04 DIAGNOSIS — E66.9 OBESITY (BMI 30-39.9): ICD-10-CM

## 2024-06-04 PROCEDURE — 99214 OFFICE O/P EST MOD 30 MIN: CPT | Performed by: INTERNAL MEDICINE

## 2024-06-04 PROCEDURE — 93000 ELECTROCARDIOGRAM COMPLETE: CPT | Performed by: INTERNAL MEDICINE

## 2024-06-04 RX ORDER — ATORVASTATIN CALCIUM 20 MG/1
TABLET, FILM COATED ORAL
COMMUNITY
Start: 2024-06-03 | End: 2024-06-04

## 2024-06-04 RX ORDER — ATORVASTATIN CALCIUM 10 MG/1
10 TABLET, FILM COATED ORAL
Qty: 90 TABLET | Refills: 1 | Status: SHIPPED | OUTPATIENT
Start: 2024-06-04

## 2024-06-04 NOTE — PROGRESS NOTES
Progress note.- Cardiology Office  [facility].    Cecelia Kong 58 y.o. female MRN: [id number]  : 1965  Unit/Bed#:  Encounter: [id number]      Assessment:     1. Hypertensive heart disease without heart failure    2. Dyslipidemia    3. Type 2 diabetes mellitus without complication, unspecified whether long term insulin use (HCC)    4. Hypothyroidism, unspecified type    5. Type 2 diabetes mellitus without complication, without long-term current use of insulin (HCC)    6. Abnormal EKG    7. Family history of early CAD    8. Obesity (BMI 30-39.9)        Discussion summary and Plan:    1.  Abnormal EKG with family history of premature coronary artery disease.  Patient's EKG shows T wave versions in lateral leads.  Nuclear stress test shows no ischemia.  She has no new symptoms no episodes of chest pain and EKG shows no change from previous EKG.    2.  Dyspnea on exertion.  Echo shows normal LV systolic function and no ischemia by nuclear.  He is currently no exertional shortness of breath.    3.  Dyslipidemia continue statins.  Advised to check periodically blood test.  Gets blood test done with her primary care doctor she is currently taking Lipitor 10 mg.  Will try to get records of her stress would prefer her LDL to be less than 70.    4.  Hypertensive heart disease with no evidence of heart failure.  She is on chlorothiazide and amlodipine.  Blood pressure is acceptable.  Same medications.  Again need to follow-up electrolytes.      5.  Chronic bilateral both knee pain with osteoarthritis.  Management as per orthopedics    6.  Obesity with a BMI around 35.  Her BMI is around 36 she need to lose some weight.    7.  Hypothyroidism.  On levothyroxine.    8.  Family history of premature coronary artery disease.  Pathophysiology of coronary artery disease discussed with her.    Current Rx follow-up in 9 months..      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  Office number was provided #[phone].      Thank you for your consultation.  If you have any question please call me at [phone]    Counseling :  A description of the counseling.  Goals and Barriers.  Patient's ability to self care: Yes  Medication side effect reviewed with patient in detail and all their questions answered to their satisfaction.      Primary Care Physician : Clint Bueno      HPI :     Cecelia Kong is a 58 y.o. year old female who was referred by primary care doctor for preoperative clearance and she has multiple cardiac risk factors.  Patient was medical history significant for pretension, hypothyroidism, diabetes mellitus, dyslipidemia, obesity with a BMI around 30 who was noted to have difficulty in walking and some exertional shortness of breath when she does her activities.  She now needs preoperative clearance.  Her EKG shows T wave inversions in lateral precordial leads.  She had a family history of heart disease her mother had congestive heart failure her sister suddenly  of heart attack possibly as a sudden cardiac death at age of 62.  She was diagnosed to have diabetes mellitus but she is on statins and blood pressure medications.  She is not very active due to her knee issues.  No other recent surgery    2023.    Reviewed.  Patient came for follow-up.  She has medical history significant for hypertension, hypothyroidism, diabetes mellitus, dyslipidemia, obesity BMI around same who came for follow-up.  She had a cardiac work-up done in the form of stress test and echo Doppler which has been acceptable previous EKG with T wave inversion lateral precordial leads.  She had family history of heart disease with your sister had sudden  of heart  attacks    6/4/2024.    Above reviewed.  Patient came for follow-up.  She had history of hypertensive heart disease, hypothyroidism, diabetes mellitus, dyslipidemia, family history of heart disease who came for follow-up.  Her EKG previously have shown T wave versions in lateral precordial leads.  She does have a family history of heart disease with her sister had sudden cardiac death with a heart attack.  She has blood test done on 4/12/2023 and has not had any recent blood test.  Cardiac workup in the form of stress test and echo reviewed from April 2023.  Current list of medication reviewed she is on Lipitor 20 mg, levothyroxine, metformin, protamine 5 mg daily and Claritin as needed.  He had blood tested with her primary care doctor and she will try to send us a copy we can ask for release.        Review of Systems   Constitutional:  Negative for activity change, chills, diaphoresis, fever and unexpected weight change.   HENT:  Negative for congestion.    Eyes:  Negative for discharge and redness.   Respiratory:  Negative for cough, chest tightness, shortness of breath and wheezing.    Cardiovascular: Negative.  Negative for chest pain, palpitations and leg swelling.   Gastrointestinal:  Negative for abdominal pain, diarrhea and nausea.   Endocrine: Negative.    Genitourinary:  Negative for decreased urine volume and urgency.   Musculoskeletal: Negative.  Negative for arthralgias, back pain and gait problem.   Skin:  Negative for rash and wound.   Allergic/Immunologic: Negative.    Neurological:  Negative for dizziness, seizures, syncope, weakness, light-headedness and headaches.   Hematological: Negative.    Psychiatric/Behavioral:  Negative for agitation and confusion. The patient is not nervous/anxious.        Historical Information   Past Medical History:   Diagnosis Date   • Depression 2016   • Diabetes mellitus (HCC) december 2022   • Disease of thyroid gland 2005   • Hyperlipidemia    • Hypertension june     • Osteoarthritis      Past Surgical History:   Procedure Laterality Date   •  SECTION      x2   • COLONOSCOPY     • SD ARTHRP KNE CONDYLE&PLATU MEDIAL&LAT COMPARTMENTS Right 2023    Procedure: ARTHROPLASTY KNEE TOTAL W ROBOT - RIGHT - PRESS FIT - OVERNIGHT;  Surgeon: Salazar Tejeda DO;  Location: [facility];  Service: Orthopedics     Social History     Substance and Sexual Activity   Alcohol Use Yes   • Alcohol/week: 1.0 standard drink of alcohol   • Types: 1 Glasses of wine per week    Comment: social drinking per month     Social History     Substance and Sexual Activity   Drug Use Never     Social History     Tobacco Use   Smoking Status Never   Smokeless Tobacco Never     Family History:   Family History   Problem Relation Age of Onset   • Diabetes Mother    • Hypertension Mother    • Cancer Maternal Aunt         lung cancer       Meds/Allergies     No Known Allergies    Current Outpatient Medications:   •  acetaminophen (TYLENOL) 325 mg tablet, Take 3 tablets (975 mg total) by mouth every 8 (eight) hours, Disp: , Rfl: 0  •  albuterol (PROVENTIL HFA,VENTOLIN HFA) 90 mcg/act inhaler, INHALE 2 PUFFS BY MOUTH FOUR TIMES A DAY FOR 30 DAYS, Disp: , Rfl:   •  amLODIPine (NORVASC) 5 mg tablet, Take 5 mg by mouth daily, Disp: , Rfl:   •  atorvastatin (LIPITOR) 10 mg tablet, Take 1 tablet (10 mg total) by mouth daily at bedtime, Disp: 90 tablet, Rfl: 1  •  Blood Glucose Monitoring Suppl (ONE TOUCH ULTRA 2) w/Device KIT, USE AS DIRECTED FOR 30 DAYS, Disp: , Rfl:   •  Calcium Carbonate-Vitamin D3 600-400 MG-UNIT TABS, Take 1 tablet by mouth 2 (two) times a day, Disp: , Rfl:   •  D3 High Potency 25 MCG (1000 UT) capsule, Take 1,000 Units by mouth daily, Disp: , Rfl:   •  Diclofenac Sodium (VOLTAREN) 1 %, APPLY 2 GRAMS TO THE AFFECTED AREAS BY TOPICAL ROUTE 4 TIMES PER DAY, Disp: , Rfl:   •  estradiol (ESTRACE) 0.1 mg/g vaginal cream, APPLY 2G INTRAVAGINALLY THREE TIMES A WEEK AT BEDTIME FOR 2 WEEKS,  "THEN TWICE A WEEK AT BEDTIME FOR 2 WEEKS, AND THEN ONE TIME A WEEK THEREA, Disp: , Rfl:   •  famotidine (PEPCID) 20 mg tablet, TAKE 1 TABLET BY MOUTH EVERY EVENING IF NEEDED, Disp: , Rfl:   •  fluticasone (FLONASE) 50 mcg/act nasal spray, SPRAY 2 SPRAYS INTO EACH NOSTRIL AT BEDTIME, Disp: , Rfl:   •  hydrochlorothiazide (HYDRODIURIL) 12.5 mg tablet, Take 12.5 mg by mouth daily, Disp: , Rfl:   •  levothyroxine 88 mcg tablet, TAKE 1 TABLET EVERY DAY BY ORAL ROUTE., Disp: , Rfl:   •  lidocaine (LMX) 4 % cream, APPLY 1 APPLICATION 3 TIMES A DAY BY TOPICAL ROUTE AS NEEDED., Disp: , Rfl:   •  loratadine (CLARITIN) 10 mg tablet, Take 10 mg by mouth daily, Disp: , Rfl:   •  metFORMIN (GLUCOPHAGE) 500 mg tablet, TAKE 1 TABLET TWICE A DAY BY ORAL ROUTE., Disp: , Rfl:   •  omeprazole (PriLOSEC) 40 MG capsule, Take 40 mg by mouth every morning, Disp: , Rfl:   •  OneTouch Ultra test strip, USE AS DIRECTED FOR 90 DAYS TEST DAILY, Disp: , Rfl:   •  Stool Softener 100 MG capsule, TAKE 1 CAPSULE (100 MG TOTAL) BY MOUTH 2 (TWO) TIMES A DAY, Disp: 60 capsule, Rfl: 0    Vitals: Blood pressure 120/70, pulse 64, height 5' 1\" (1.549 m), weight 86.2 kg (190 lb), SpO2 98%.  ?  Body mass index is 35.9 kg/m².  Wt Readings from Last 3 Encounters:   06/04/24 86.2 kg (190 lb)   05/08/24 85.1 kg (187 lb 9.6 oz)   07/28/23 79.8 kg (176 lb)     Vitals:    06/04/24 1624   Weight: 86.2 kg (190 lb)     BP Readings from Last 3 Encounters:   06/04/24 120/70   05/08/24 129/81   07/28/23 128/78         Physical Exam  Constitutional:       General: She is not in acute distress.     Appearance: She is well-developed. She is not diaphoretic.   Neck:      Thyroid: No thyromegaly.      Vascular: No JVD.      Trachea: No tracheal deviation.   Cardiovascular:      Rate and Rhythm: Normal rate and regular rhythm.      Heart sounds: S1 normal and S2 normal. Heart sounds not distant. Murmur heard.      Systolic (ejection) murmur is present with a grade of 2/6.      " "No friction rub. No gallop. No S3 or S4 sounds.   Pulmonary:      Effort: Pulmonary effort is normal. No respiratory distress.      Breath sounds: Normal breath sounds. No wheezing or rales.   Chest:      Chest wall: No tenderness.   Abdominal:      General: Bowel sounds are normal. There is no distension.      Palpations: Abdomen is soft.      Tenderness: There is no abdominal tenderness.   Musculoskeletal:         General: No deformity.      Cervical back: Neck supple.   Skin:     General: Skin is warm and dry.      Coloration: Skin is not pale.      Findings: No rash.   Neurological:      Mental Status: She is alert and oriented to person, place, and time.   Psychiatric:         Behavior: Behavior normal.         Judgment: Judgment normal.             Diagnostic Studies Review Cardio:    Nuclear stress test.  Nuclear stress test done March 2023 shows no ischemia EF 73%.    Echo Doppler.  Echo Doppler shows EF 60%, mild valvular disease.  EKG:  Twelve-lead EKG done on 3/10/2023 normal sinus rhythm heart rate 61 bpm with a T wave normality in lateral precordial leads cannot rule out ischemia.  No old EKG for comparison.    Her twelve-lead EKG done on 6/4/2024 shows sinus rhythm with the T wave inversions in lateral precordial leads which has not changed from previous EKG.      Dr. Camila Lemon MD [facility]      \"This note was completed in part utilizing [employer] direct voice recognition software.   Grammatical errors, random word insertion, spelling mistakes, and incomplete sentences may be an occasional consequence of the system secondary to software limitations, ambient noise and hardware issues.    Please read the chart carefully and recognize, using context, where substitutions have occurred.  If you have any questions or concerns about the context, text or information contained within the body of this dictation, please contact myself, the provider, for further clarification.\"  "

## 2024-06-10 ENCOUNTER — TELEPHONE (OUTPATIENT)
Dept: CARDIOLOGY CLINIC | Facility: CLINIC | Age: 59
End: 2024-06-10

## 2024-06-10 DIAGNOSIS — E78.5 DYSLIPIDEMIA: Primary | ICD-10-CM

## 2024-06-11 DIAGNOSIS — I11.9 HYPERTENSIVE HEART DISEASE WITHOUT HEART FAILURE: Primary | ICD-10-CM

## 2024-06-11 DIAGNOSIS — E11.9 TYPE 2 DIABETES MELLITUS WITHOUT COMPLICATION, UNSPECIFIED WHETHER LONG TERM INSULIN USE (HCC): ICD-10-CM

## 2024-06-11 NOTE — TELEPHONE ENCOUNTER
P/C back and advised message per provider about ordered advanced lipid panel, pt would like it mailed to home address.

## 2025-03-04 ENCOUNTER — RESULTS FOLLOW-UP (OUTPATIENT)
Dept: CARDIOLOGY CLINIC | Facility: CLINIC | Age: 60
End: 2025-03-04

## 2025-03-04 LAB
CHOLEST SERPL-MCNC: 174 MG/DL (ref 100–199)
CHOLEST/HDLC SERPL: 2.7 RATIO (ref 0–4.4)
HDLC SERPL-MCNC: 65 MG/DL
LDLC SERPL CALC-MCNC: 94 MG/DL (ref 0–99)
SL AMB VLDL CHOLESTEROL CALC: 15 MG/DL (ref 5–40)
TRIGL SERPL-MCNC: 81 MG/DL (ref 0–149)

## 2025-03-10 ENCOUNTER — OFFICE VISIT (OUTPATIENT)
Dept: CARDIOLOGY CLINIC | Facility: CLINIC | Age: 60
End: 2025-03-10
Payer: COMMERCIAL

## 2025-03-10 VITALS
OXYGEN SATURATION: 96 % | WEIGHT: 195 LBS | HEART RATE: 65 BPM | SYSTOLIC BLOOD PRESSURE: 120 MMHG | DIASTOLIC BLOOD PRESSURE: 80 MMHG | HEIGHT: 61 IN | BODY MASS INDEX: 36.82 KG/M2

## 2025-03-10 DIAGNOSIS — E66.9 OBESITY (BMI 30-39.9): ICD-10-CM

## 2025-03-10 DIAGNOSIS — Z82.49 FAMILY HISTORY OF EARLY CAD: ICD-10-CM

## 2025-03-10 DIAGNOSIS — E03.9 HYPOTHYROIDISM, UNSPECIFIED TYPE: ICD-10-CM

## 2025-03-10 DIAGNOSIS — R94.31 ABNORMAL EKG: ICD-10-CM

## 2025-03-10 DIAGNOSIS — R06.09 DYSPNEA ON EXERTION: ICD-10-CM

## 2025-03-10 DIAGNOSIS — E78.5 DYSLIPIDEMIA: ICD-10-CM

## 2025-03-10 DIAGNOSIS — I11.9 HYPERTENSIVE HEART DISEASE WITHOUT HEART FAILURE: ICD-10-CM

## 2025-03-10 DIAGNOSIS — E11.9 TYPE 2 DIABETES MELLITUS WITHOUT COMPLICATION, UNSPECIFIED WHETHER LONG TERM INSULIN USE (HCC): ICD-10-CM

## 2025-03-10 PROCEDURE — 99214 OFFICE O/P EST MOD 30 MIN: CPT | Performed by: INTERNAL MEDICINE

## 2025-03-10 PROCEDURE — 93000 ELECTROCARDIOGRAM COMPLETE: CPT | Performed by: INTERNAL MEDICINE

## 2025-03-10 RX ORDER — METOPROLOL TARTRATE 25 MG/1
50 TABLET, FILM COATED ORAL AS NEEDED
Qty: 2 TABLET | Refills: 0 | Status: SHIPPED | OUTPATIENT
Start: 2025-03-10 | End: 2025-03-12

## 2025-03-10 RX ORDER — ATORVASTATIN CALCIUM 20 MG/1
1 TABLET, FILM COATED ORAL DAILY
COMMUNITY
Start: 2025-02-17 | End: 2025-03-10

## 2025-03-10 NOTE — PROGRESS NOTES
Progress note.- Cardiology Office  [facility].    Cecelia Kong 59 y.o. female MRN: [id number]  : 1965  Unit/Bed#:  Encounter: [id number]      Assessment:     1. Dyspnea on exertion    2. Hypertensive heart disease without heart failure    3. Dyslipidemia    4. Abnormal EKG    5. Obesity (BMI 30-39.9)    6. Family history of early CAD    7. Type 2 diabetes mellitus without complication, unspecified whether long term insulin use (HCC)    8. Hypothyroidism, unspecified type        Discussion summary and Plan:    1.  Abnormal EKG with family history of premature coronary artery disease.  Patient's EKG shows T wave versions in lateral leads.  Nuclear stress test shows no ischemia.  Stress test was done in 2023.  Her EKG shows no changes she has no new symptoms.    2.  Dyspnea on exertion.  Patient has dyspnea on exertion but she also had a strong family history of premature coronary artery disease her brother also  recently in sleep due to sudden cardiac death her sister also .  She has gained some weight shortness of breath may be related to it but will update a coronary CTA.    3.  Dyslipidemia continue statins.  He does her cholesterol testing done with her primary care doctor.  She was on 20 mg it was cut back to 10 mg.  She had family history of sudden cardiac death with diabetes will expect LDL less than 70 currently 94 she will discuss with her primary care doctor.  He is managing her statins.    4.  Hypertensive heart disease with no evidence of heart failure.  She is on amlodipine and hydrochlorothiazide.  She should get at least once a year her electrolyte testing done.      5.  Chronic bilateral both knee pain with osteoarthritis.  Management as per orthopedics    6.  Obesity with a BMI around 36.8.  Which she is aware that she has gained weight she will be trying to be more active.    7.  Hypothyroidism.  On levothyroxine.  Management as per medical team.    8.   Family history of premature coronary artery disease.  Patient has multiple family members who have of premature coronary artery disease with history of MI.  Continue aggressive risk factor reduction including blood pressure control diabetes control and cholesterol control with LDL goal less than 70.  Discussed with patient again.    Current Rx follow-up in 9 months..      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  Office number was provided #[phone].      Thank you for your consultation.  If you have any question please call me at [phone]    Counseling :  A description of the counseling.  Goals and Barriers.  Patient's ability to self care: Yes  Medication side effect reviewed with patient in detail and all their questions answered to their satisfaction.      Primary Care Physician : Clint Bueno      HPI :     Cecelia Kong is a 59 y.o. year old female who was referred by primary care doctor for preoperative clearance and she has multiple cardiac risk factors.  Patient was medical history significant for pretension, hypothyroidism, diabetes mellitus, dyslipidemia, obesity with a BMI around 30 who was noted to have difficulty in walking and some exertional shortness of breath when she does her activities.  She now needs preoperative clearance.  Her EKG shows T wave inversions in lateral precordial leads.  She had a family history of heart disease her mother had congestive heart failure her sister suddenly  of heart attack possibly as a sudden cardiac death at age of 62.  She was diagnosed to have diabetes mellitus but she is on statins and blood pressure medications.  She is not very active due to her knee issues.  No other recent  surgery    2023.    Reviewed.  Patient came for follow-up.  She has medical history significant for hypertension, hypothyroidism, diabetes mellitus, dyslipidemia, obesity BMI around same who came for follow-up.  She had a cardiac work-up done in the form of stress test and echo Doppler which has been acceptable previous EKG with T wave inversion lateral precordial leads.  She had family history of heart disease with your sister had sudden  of heart attacks    2024.    Above reviewed.  Patient came for follow-up.  She had history of hypertensive heart disease, hypothyroidism, diabetes mellitus, dyslipidemia, family history of heart disease who came for follow-up.  Her EKG previously have shown T wave versions in lateral precordial leads.  She does have a family history of heart disease with her sister had sudden cardiac death with a heart attack.  She has blood test done on 2023 and has not had any recent blood test.  Cardiac workup in the form of stress test and echo reviewed from 2023.  Current list of medication reviewed she is on Lipitor 20 mg, levothyroxine, metformin, prota  3/10/2025.    Above reviewed.  Patient came for follow-up.  She had history of hypertensive heart disease, diabetes mellitus, dyslipidemia, hypothyroidism, abnormal EKG with T wave inversion lateral precordial leads, family's of heart disease with her sister having sudden cardiac death who came for follow-up.  She has gained some weight since her last visit.  Her cholesterol test from 3/3/2025 shows LDL 94 other labs are acceptable.  Her cardiac workup in the form of stress test and echo from 2023 reviewed.  Her current list of medication include amlodipine 5 mg daily, atorvastatin 10 mg daily, hydrochlorothiazide 12.5 mg daily levothyroxine, and takes her diabetic medications.  She is aware that she has gained some weight but otherwise she is feeling well EKG shows sinus rhythm with a heart rate 65 bpm.  Her  primary care doctor has decreased her statin to 10 mg.  LDL is 94 currently.  She is scheduled to have repeat blood test.        Review of Systems   Constitutional:  Negative for activity change, chills, diaphoresis, fever and unexpected weight change.        Patient has gained some weight.  She is aware of it.   HENT:  Negative for congestion.    Eyes:  Negative for discharge and redness.   Respiratory:  Negative for cough, chest tightness, shortness of breath and wheezing.    Cardiovascular: Negative.  Negative for chest pain, palpitations and leg swelling.   Gastrointestinal:  Negative for abdominal pain, diarrhea and nausea.   Endocrine: Negative.    Genitourinary:  Negative for decreased urine volume and urgency.   Musculoskeletal: Negative.  Negative for arthralgias, back pain and gait problem.   Skin:  Negative for rash and wound.   Allergic/Immunologic: Negative.    Neurological:  Negative for dizziness, seizures, syncope, weakness, light-headedness and headaches.   Hematological: Negative.    Psychiatric/Behavioral:  Negative for agitation and confusion. The patient is not nervous/anxious.        Historical Information   Past Medical History:   Diagnosis Date    Depression     Diabetes mellitus (HCC) 2022    Disease of thyroid gland     Hyperlipidemia     Hypertension 2022    Osteoarthritis      Past Surgical History:   Procedure Laterality Date     SECTION      x2    COLONOSCOPY      [place] ARTHRP KNE CONDYLE&PLATU MEDIAL&LAT COMPARTMENTS Right 2023    Procedure: ARTHROPLASTY KNEE TOTAL W ROBOT - RIGHT - PRESS FIT - OVERNIGHT;  Surgeon: Salazar Tejeda DO;  Location: [facility];  Service: Orthopedics     Social History     Substance and Sexual Activity   Alcohol Use Yes    Alcohol/week: 1.0 standard drink of alcohol    Types: 1 Glasses of wine per week    Comment: social drinking per month     Social History     Substance and Sexual Activity   Drug Use Never     Social  History     Tobacco Use   Smoking Status Never   Smokeless Tobacco Never     Family History:   Family History   Problem Relation Age of Onset    Diabetes Mother     Hypertension Mother     Cancer Maternal Aunt         lung cancer       Meds/Allergies     No Known Allergies    Current Outpatient Medications:     acetaminophen (TYLENOL) 325 mg tablet, Take 3 tablets (975 mg total) by mouth every 8 (eight) hours, Disp: , Rfl: 0    albuterol (PROVENTIL HFA,VENTOLIN HFA) 90 mcg/act inhaler, INHALE 2 PUFFS BY MOUTH FOUR TIMES A DAY FOR 30 DAYS, Disp: , Rfl:     amLODIPine (NORVASC) 5 mg tablet, Take 5 mg by mouth daily, Disp: , Rfl:     atorvastatin (LIPITOR) 10 mg tablet, Take 1 tablet (10 mg total) by mouth daily at bedtime, Disp: 90 tablet, Rfl: 1    Blood Glucose Monitoring Suppl (ONE TOUCH ULTRA 2) w/Device KIT, USE AS DIRECTED FOR 30 DAYS, Disp: , Rfl:     Calcium Carbonate-Vitamin D3 600-400 MG-UNIT TABS, Take 1 tablet by mouth 2 (two) times a day, Disp: , Rfl:     D3 High Potency 25 MCG (1000 UT) capsule, Take 1,000 Units by mouth daily, Disp: , Rfl:     Diclofenac Sodium (VOLTAREN) 1 %, APPLY 2 GRAMS TO THE AFFECTED AREAS BY TOPICAL ROUTE 4 TIMES PER DAY, Disp: , Rfl:     estradiol (ESTRACE) 0.1 mg/g vaginal cream, APPLY 2G INTRAVAGINALLY THREE TIMES A WEEK AT BEDTIME FOR 2 WEEKS, THEN TWICE A WEEK AT BEDTIME FOR 2 WEEKS, AND THEN ONE TIME A WEEK THEREA, Disp: , Rfl:     famotidine (PEPCID) 20 mg tablet, TAKE 1 TABLET BY MOUTH EVERY EVENING IF NEEDED, Disp: , Rfl:     fluticasone (FLONASE) 50 mcg/act nasal spray, SPRAY 2 SPRAYS INTO EACH NOSTRIL AT BEDTIME, Disp: , Rfl:     hydrochlorothiazide (HYDRODIURIL) 12.5 mg tablet, Take 12.5 mg by mouth daily, Disp: , Rfl:     levothyroxine 88 mcg tablet, TAKE 1 TABLET EVERY DAY BY ORAL ROUTE., Disp: , Rfl:     lidocaine (LMX) 4 % cream, APPLY 1 APPLICATION 3 TIMES A DAY BY TOPICAL ROUTE AS NEEDED., Disp: , Rfl:     loratadine (CLARITIN) 10 mg tablet, Take 10 mg by mouth  "daily, Disp: , Rfl:     metFORMIN (GLUCOPHAGE) 500 mg tablet, TAKE 1 TABLET TWICE A DAY BY ORAL ROUTE., Disp: , Rfl:     omeprazole (PriLOSEC) 40 MG capsule, Take 40 mg by mouth every morning, Disp: , Rfl:     OneTouch Ultra test strip, USE AS DIRECTED FOR 90 DAYS TEST DAILY, Disp: , Rfl:     Vitals: Blood pressure 120/80, pulse 65, height 5' 1\" (1.549 m), weight 88.5 kg (195 lb), SpO2 96%.    Body mass index is 36.84 kg/m².  Wt Readings from Last 3 Encounters:   03/10/25 88.5 kg (195 lb)   06/04/24 86.2 kg (190 lb)   05/08/24 85.1 kg (187 lb 9.6 oz)     Vitals:    03/10/25 1642   Weight: 88.5 kg (195 lb)       BP Readings from Last 3 Encounters:   03/10/25 120/80   06/04/24 120/70   05/08/24 129/81         Physical Exam  Constitutional:       General: She is not in acute distress.     Appearance: She is well-developed. She is not diaphoretic.   Neck:      Thyroid: No thyromegaly.      Vascular: No JVD.      Trachea: No tracheal deviation.   Cardiovascular:      Rate and Rhythm: Normal rate and regular rhythm.      Heart sounds: S1 normal and S2 normal. Heart sounds not distant. Murmur heard.      Systolic (ejection) murmur is present with a grade of 2/6.      No friction rub. No gallop. No S3 or S4 sounds.   Pulmonary:      Effort: Pulmonary effort is normal. No respiratory distress.      Breath sounds: Normal breath sounds. No wheezing or rales.   Chest:      Chest wall: No tenderness.   Abdominal:      General: Bowel sounds are normal. There is no distension.      Palpations: Abdomen is soft.      Tenderness: There is no abdominal tenderness.   Musculoskeletal:         General: No deformity.      Cervical back: Neck supple.   Skin:     General: Skin is warm and dry.      Coloration: Skin is not pale.      Findings: No rash.   Neurological:      Mental Status: She is alert and oriented to person, place, and time.   Psychiatric:         Behavior: Behavior normal.         Judgment: Judgment normal. " "            Diagnostic Studies Review Cardio:    Nuclear stress test.  Nuclear stress test done March 2023 shows no ischemia EF 73%.    Echo Doppler.  Echo Doppler shows EF 60%, mild valvular disease.    EKG:  Twelve-lead EKG done on 3/10/2023 normal sinus rhythm heart rate 61 bpm with a T wave normality in lateral precordial leads cannot rule out ischemia.  No old EKG for comparison.    Her twelve-lead EKG done on 6/4/2024 shows sinus rhythm with the T wave inversions in lateral precordial leads which has not changed from previous EKG.    Twelve-lead EKG done on 3/10/2025 shows normal sinus rhythm with a heart rate 65 bpm.  T wave inversion noted in lateral precordial leads not changed from previous EKG also T wave version noted in 1 and aVL.      Dr. Camila Lemon MD [facility]      \"This note was completed in part utilizing [employer] direct voice recognition software.   Grammatical errors, random word insertion, spelling mistakes, and incomplete sentences may be an occasional consequence of the system secondary to software limitations, ambient noise and hardware issues.    Please read the chart carefully and recognize, using context, where substitutions have occurred.  If you have any questions or concerns about the context, text or information contained within the body of this dictation, please contact myself, the provider, for further clarification.\"  "

## 2025-03-11 DIAGNOSIS — E78.5 DYSLIPIDEMIA: ICD-10-CM

## 2025-03-11 DIAGNOSIS — I11.9 HYPERTENSIVE HEART DISEASE WITHOUT HEART FAILURE: ICD-10-CM

## 2025-03-11 DIAGNOSIS — E11.9 TYPE 2 DIABETES MELLITUS WITHOUT COMPLICATION, UNSPECIFIED WHETHER LONG TERM INSULIN USE (HCC): ICD-10-CM

## 2025-03-11 DIAGNOSIS — Z82.49 FAMILY HISTORY OF EARLY CAD: ICD-10-CM

## 2025-03-11 DIAGNOSIS — R06.09 DYSPNEA ON EXERTION: ICD-10-CM

## 2025-03-11 DIAGNOSIS — R94.31 ABNORMAL EKG: ICD-10-CM

## 2025-03-11 DIAGNOSIS — E66.9 OBESITY (BMI 30-39.9): ICD-10-CM

## 2025-03-11 DIAGNOSIS — E03.9 HYPOTHYROIDISM, UNSPECIFIED TYPE: ICD-10-CM

## 2025-03-12 RX ORDER — METOPROLOL TARTRATE 25 MG/1
50 TABLET, FILM COATED ORAL AS NEEDED
Qty: 2 TABLET | Refills: 0 | Status: SHIPPED | OUTPATIENT
Start: 2025-03-12 | End: 2025-03-13

## 2025-03-17 ENCOUNTER — TELEPHONE (OUTPATIENT)
Age: 60
End: 2025-03-17

## 2025-03-17 NOTE — TELEPHONE ENCOUNTER
Pt.made aware of the Metoprolol to take 1 - 2 hours prior to Cardiac CTA. Pt.verbalized understanding. Order faxed to facility.

## 2025-03-17 NOTE — TELEPHONE ENCOUNTER
Patient has an order for CTA Cardiac w FFR if needed which she plans to have done at [facility].    Patient provided a fax number  for [facility] where order for test should be faxed.    Please fax to :  [phone]   Attn: Carla    Patient also asked for clarification for script for metoprolol tartrate to take prior to the CTA.  Script is for 25 mg take 2 tablets if needed.  Patient is confused on instructions for the beta blocker to take before the test.  Is it to be taken the day of the test, the day before?